# Patient Record
Sex: MALE | Race: WHITE | NOT HISPANIC OR LATINO | Employment: UNEMPLOYED | URBAN - METROPOLITAN AREA
[De-identification: names, ages, dates, MRNs, and addresses within clinical notes are randomized per-mention and may not be internally consistent; named-entity substitution may affect disease eponyms.]

---

## 2021-04-06 ENCOUNTER — HISTORICAL (OUTPATIENT)
Dept: LAB | Facility: HOSPITAL | Age: 61
End: 2021-04-06

## 2021-04-06 LAB
CK MB SERPL-MCNC: 42.3 NG/ML
CK SERPL-CCNC: 587 U/L (ref 30–200)
TROPONIN I SERPL-MCNC: 8.42 NG/ML (ref 0–0.04)

## 2022-04-26 DIAGNOSIS — Z12.11 ENCOUNTER FOR SCREENING COLONOSCOPY: ICD-10-CM

## 2022-04-26 DIAGNOSIS — K62.5 RECTAL BLEEDING: Primary | ICD-10-CM

## 2023-04-24 ENCOUNTER — HOSPITAL ENCOUNTER (EMERGENCY)
Facility: HOSPITAL | Age: 63
Discharge: HOME | End: 2023-04-24
Attending: FAMILY MEDICINE

## 2023-04-24 VITALS
HEART RATE: 67 BPM | DIASTOLIC BLOOD PRESSURE: 80 MMHG | TEMPERATURE: 98 F | BODY MASS INDEX: 30.12 KG/M2 | WEIGHT: 234.69 LBS | OXYGEN SATURATION: 96 % | SYSTOLIC BLOOD PRESSURE: 158 MMHG | RESPIRATION RATE: 17 BRPM | HEIGHT: 74 IN

## 2023-04-24 DIAGNOSIS — R16.0 LIVER MASSES: ICD-10-CM

## 2023-04-24 DIAGNOSIS — K62.89 RECTAL MASS: Primary | ICD-10-CM

## 2023-04-24 LAB
ALBUMIN SERPL-MCNC: 3 G/DL (ref 3.4–4.8)
ALBUMIN/GLOB SERPL: 0.7 RATIO (ref 1.1–2)
ALP SERPL-CCNC: 134 UNIT/L (ref 40–150)
ALT SERPL-CCNC: 11 UNIT/L (ref 0–55)
APPEARANCE UR: CLEAR
APTT PPP: 33.7 SECONDS
AST SERPL-CCNC: 17 UNIT/L (ref 5–34)
BACTERIA #/AREA URNS AUTO: NORMAL /HPF
BASOPHILS # BLD AUTO: 0.13 X10(3)/MCL (ref 0–0.2)
BASOPHILS NFR BLD AUTO: 1.3 %
BILIRUB UR QL STRIP.AUTO: NEGATIVE MG/DL
BILIRUBIN DIRECT+TOT PNL SERPL-MCNC: 0.6 MG/DL
BUN SERPL-MCNC: 16.8 MG/DL (ref 8.4–25.7)
CALCIUM SERPL-MCNC: 9.1 MG/DL (ref 8.8–10)
CHLORIDE SERPL-SCNC: 97 MMOL/L (ref 98–107)
CO2 SERPL-SCNC: 26 MMOL/L (ref 23–31)
COLOR UR AUTO: YELLOW
CREAT SERPL-MCNC: 1.16 MG/DL (ref 0.73–1.18)
EOSINOPHIL # BLD AUTO: 0.21 X10(3)/MCL (ref 0–0.9)
EOSINOPHIL NFR BLD AUTO: 2.1 %
ERYTHROCYTE [DISTWIDTH] IN BLOOD BY AUTOMATED COUNT: 12.2 % (ref 11.5–17)
GFR SERPLBLD CREATININE-BSD FMLA CKD-EPI: >60 MLS/MIN/1.73/M2
GLOBULIN SER-MCNC: 4.3 GM/DL (ref 2.4–3.5)
GLUCOSE SERPL-MCNC: 111 MG/DL (ref 82–115)
GLUCOSE UR QL STRIP.AUTO: NEGATIVE MG/DL
HCT VFR BLD AUTO: 40.6 % (ref 42–52)
HGB BLD-MCNC: 13.6 G/DL (ref 14–18)
IMM GRANULOCYTES # BLD AUTO: 0.03 X10(3)/MCL (ref 0–0.04)
IMM GRANULOCYTES NFR BLD AUTO: 0.3 %
KETONES UR QL STRIP.AUTO: NEGATIVE MG/DL
LEUKOCYTE ESTERASE UR QL STRIP.AUTO: NEGATIVE UNIT/L
LYMPHOCYTES # BLD AUTO: 1.51 X10(3)/MCL (ref 0.6–4.6)
LYMPHOCYTES NFR BLD AUTO: 15.4 %
MCH RBC QN AUTO: 28.6 PG (ref 27–31)
MCHC RBC AUTO-ENTMCNC: 33.5 G/DL (ref 33–36)
MCV RBC AUTO: 85.5 FL (ref 80–94)
MONOCYTES # BLD AUTO: 0.86 X10(3)/MCL (ref 0.1–1.3)
MONOCYTES NFR BLD AUTO: 8.7 %
NEUTROPHILS # BLD AUTO: 7.09 X10(3)/MCL (ref 2.1–9.2)
NEUTROPHILS NFR BLD AUTO: 72.2 %
NITRITE UR QL STRIP.AUTO: NEGATIVE
NRBC BLD AUTO-RTO: 0 %
PH UR STRIP.AUTO: 5.5 [PH]
PLATELET # BLD AUTO: 444 X10(3)/MCL (ref 130–400)
PMV BLD AUTO: 9 FL (ref 7.4–10.4)
POTASSIUM SERPL-SCNC: 4.3 MMOL/L (ref 3.5–5.1)
PROT SERPL-MCNC: 7.3 GM/DL (ref 5.8–7.6)
PROT UR QL STRIP.AUTO: NEGATIVE MG/DL
RBC # BLD AUTO: 4.75 X10(6)/MCL (ref 4.7–6.1)
RBC #/AREA URNS AUTO: NORMAL /HPF
RBC UR QL AUTO: NEGATIVE UNIT/L
SODIUM SERPL-SCNC: 134 MMOL/L (ref 136–145)
SP GR UR STRIP.AUTO: 1.01
SQUAMOUS #/AREA URNS LPF: NORMAL /HPF
UROBILINOGEN UR STRIP-ACNC: NORMAL MG/DL
WBC # SPEC AUTO: 9.8 X10(3)/MCL (ref 4.5–11.5)
WBC #/AREA URNS AUTO: NORMAL /HPF

## 2023-04-24 PROCEDURE — 81001 URINALYSIS AUTO W/SCOPE: CPT | Performed by: PHYSICIAN ASSISTANT

## 2023-04-24 PROCEDURE — 85025 COMPLETE CBC W/AUTO DIFF WBC: CPT | Performed by: PHYSICIAN ASSISTANT

## 2023-04-24 PROCEDURE — 85730 THROMBOPLASTIN TIME PARTIAL: CPT | Performed by: PHYSICIAN ASSISTANT

## 2023-04-24 PROCEDURE — 25500020 PHARM REV CODE 255: Performed by: PHYSICIAN ASSISTANT

## 2023-04-24 PROCEDURE — 99285 EMERGENCY DEPT VISIT HI MDM: CPT | Mod: 25

## 2023-04-24 PROCEDURE — 80053 COMPREHEN METABOLIC PANEL: CPT | Performed by: PHYSICIAN ASSISTANT

## 2023-04-24 PROCEDURE — 85610 PROTHROMBIN TIME: CPT | Performed by: PHYSICIAN ASSISTANT

## 2023-04-24 RX ORDER — HYDROCHLOROTHIAZIDE 25 MG/1
25 TABLET ORAL
COMMUNITY
Start: 2023-04-17

## 2023-04-24 RX ORDER — OMEPRAZOLE 40 MG/1
40 CAPSULE, DELAYED RELEASE ORAL
COMMUNITY
Start: 2022-09-18

## 2023-04-24 RX ORDER — LOSARTAN POTASSIUM 100 MG/1
100 TABLET ORAL EVERY MORNING
COMMUNITY
Start: 2023-03-27

## 2023-04-24 RX ADMIN — IOHEXOL 100 ML: 350 INJECTION, SOLUTION INTRAVENOUS at 01:04

## 2023-04-24 NOTE — DISCHARGE INSTRUCTIONS
Referral sent to gastrointestinal clinic.  They will call you to schedule an appointment.  If you have not received a phone call within 1 week call, our , Dian [phone].  Follow up with your primary care provider within 2-3 days.

## 2023-04-24 NOTE — ED PROVIDER NOTES
Encounter Date: 2023       History     Chief Complaint   Patient presents with    Back Pain    Rectal Bleeding     Pt arrives with c/o lower back pain and worsening rectal bleeding with dark and bright blood x1 week     62-year-old male with a history of CAD, HLD, hypertension, MI, obesity, presents emergency department with complaints of rectal bleeding x1 year that worsened within the last week, along with bilateral lower back pain and bilateral upper abdominal pain..  He also endorses weight loss ( about 30 lbs).  He states he is daily rectal bleeding with and without bowel movements which alternates between bright red blood and dark/black color.  He rates his pain 5/10.  He denies dizziness, lightheadedness, headache, shortness of breath, chest pain, dysuria, fever, chills, constipation.      The history is provided by the patient. No  was used.   Review of patient's allergies indicates:  No Known Allergies  Past Medical History:   Diagnosis Date    CAD (coronary artery disease)     HLD (hyperlipidemia)     HTN (hypertension)     MI (myocardial infarction)     Obesity, unspecified      Past Surgical History:   Procedure Laterality Date    CORONARY STENT PLACEMENT       History reviewed. No pertinent family history.  Social History     Tobacco Use    Smoking status: Former     Types: Cigarettes     Quit date:      Years since quittin.3     Review of Systems   Constitutional:  Positive for unexpected weight change (lost 30 lbs). Negative for chills and fever.   Respiratory:  Negative for cough and shortness of breath.    Cardiovascular:  Negative for chest pain and palpitations.   Gastrointestinal:  Positive for abdominal pain (upper), anal bleeding and blood in stool. Negative for constipation, nausea and vomiting.   Genitourinary:  Negative for dysuria, flank pain and hematuria.   Musculoskeletal:  Positive for back pain (bi lower). Negative for neck pain.   Skin:  Negative for  rash.   Neurological:  Negative for dizziness, light-headedness and headaches.     Physical Exam     Initial Vitals [04/24/23 1034]   BP Pulse Resp Temp SpO2   (!) 164/81 88 18 97.9 °F (36.6 °C) 99 %      MAP       --         Physical Exam    Nursing note and vitals reviewed.  Constitutional: He appears well-developed and well-nourished.   HENT:   Nose: Nose normal.   Mouth/Throat: Oropharynx is clear and moist.   Eyes: Conjunctivae are normal.   Neck: Neck supple.   Normal range of motion.  Cardiovascular:  Normal rate, normal heart sounds and intact distal pulses.           Pulmonary/Chest: Breath sounds normal.   Abdominal: Abdomen is soft. Bowel sounds are normal. There is no abdominal tenderness (Bilateral upper). There is no rebound and no guarding.   Genitourinary:    Genitourinary Comments: Patient declined     Musculoskeletal:         General: Normal range of motion.      Cervical back: Normal range of motion and neck supple. No bony tenderness.      Thoracic back: No bony tenderness.      Lumbar back: No bony tenderness.      Comments: No CVA tenderness     Lymphadenopathy:     He has no cervical adenopathy.   Neurological: He is alert and oriented to person, place, and time. GCS score is 15. GCS eye subscore is 4. GCS verbal subscore is 5. GCS motor subscore is 6.   Skin: Skin is warm. Capillary refill takes less than 2 seconds.       ED Course   Procedures  Labs Reviewed   COMPREHENSIVE METABOLIC PANEL - Abnormal; Notable for the following components:       Result Value    Sodium Level 134 (*)     Chloride 97 (*)     Albumin Level 3.0 (*)     Globulin 4.3 (*)     Albumin/Globulin Ratio 0.7 (*)     All other components within normal limits   CBC WITH DIFFERENTIAL - Abnormal; Notable for the following components:    Hgb 13.6 (*)     Hct 40.6 (*)     Platelet 444 (*)     All other components within normal limits   APTT - Normal   CBC W/ AUTO DIFFERENTIAL    Narrative:     The following orders were created  for panel order CBC Auto Differential.  Procedure                               Abnormality         Status                     ---------                               -----------         ------                     CBC with Differential[[id number]]        Abnormal            Final result                 Please view results for these tests on the individual orders.   URINALYSIS, REFLEX TO URINE CULTURE   PROTIME-INR   EXTRA TUBES    Narrative:     The following orders were created for panel order EXTRA TUBES.  Procedure                               Abnormality         Status                     ---------                               -----------         ------                     Light Green Top Hold[[id number]]                             In process                 Lavender Top Hold[[id number]]                                In process                 Gold Top Hold[[id number]]                                    In process                 Pink Top Hold[[id number]]                                    In process                   Please view results for these tests on the individual orders.   LIGHT GREEN TOP HOLD   LAVENDER TOP HOLD   GOLD TOP HOLD   PINK TOP HOLD          Imaging Results               CT Abdomen Pelvis With Contrast (Final result)  Result time 04/24/23 13:32:27      Final result by Tylor Ash MD (04/24/23 13:32:27)                   Impression:      Findings seen consistent with malignancy in the rectum with metastatic pelvic lymphadenopathy seen and metastatic foci seen throughout the liver    This report was flagged in Epic as abnormal.      Electronically signed by: Tylor Ash  Date:    04/24/2023  Time:    13:32               Narrative:    EXAMINATION:  CT ABDOMEN PELVIS WITH CONTRAST    CLINICAL HISTORY:  GI bleed;rectal bleeding x 1 year, lower abdominal pain;    TECHNIQUE:  Low dose axial images, sagittal and coronal reformations were obtained from the lung bases to the pubic  symphysis following the IV administration of contrast. Automatic exposure control (AEC) is utilized to reduce patient radiation exposure.    COMPARISON:  None.    FINDINGS:  The lung bases are clear.    There are multiple liver masses seen which are too numerous to count.  There are predominantly hypoattenuated.  Findings are consistent with metastatic foci.  Reference measurement is made of the largest liver lesion which is seen in segment 3.  It measures 5.1 by 5.1 cm.  The portal and hepatic veins appear grossly unremarkable.    The gallbladder appears normal.  No obvious gallstones are seen.  No biliary dilatation is seen.  No pericholecystic fluid is seen.    The pancreas appears normal.  No pancreatic mass or lesion is seen.    The spleen shows no acute abnormality.    The adrenal glands appear normal.  No adrenal nodule is seen.    The kidneys appear normal.  No hydronephrosis is seen.  No hydroureter is seen.  No nephrolithiasis is seen.  No obvious ureteral stones are seen.    Urinary bladder appears grossly unremarkable.    There is circumferential wall thickening seen in the region of the rectum with a mass seen in the region of the rectum.  This likely represents the primary malignancy.  There are multiple lymph nodes seen in the pelvis.  Largest lymph nodes seen in the right deep hemipelvis on image number 82 series 2.  It measures 1.4 x 2 cm.    No free air is seen.  No free fluid is seen.                                       Medications   iohexoL (OMNIPAQUE 350) 350 mg iodine/mL injection (has no administration in time range)   iohexoL (OMNIPAQUE 350) injection 100 mL (100 mLs Intravenous Given 4/24/23 1325)     Medical Decision Making:   Initial Assessment:   62-year-old male with a history of CAD, HLD, hypertension, MI, obesity, presents emergency department with complaints of rectal bleeding x1 year that worsened within the last week, along with bilateral lower back pain and bilateral upper  abdominal pain  Differential Diagnosis:    hemorrhoids, constipation, malignancy, fissure  Clinical Tests:   Lab Tests: Ordered and Reviewed       <> Summary of Lab: Hemoglobin 13.6 hematocrit 46  Radiological Study: Ordered and Reviewed  ED Management:  CT abdomen and pelvis with contrast: Findings seen consistent with malignancy in the rectum with metastatic pelvic lymphadenopathy seen and metastatic foci seen throughout the liver      Referral sent to GI clinic for emergent follow-up.  I spoke with our  Dian, who will help getting close follow up appointment.  Patient called his PCP to inform them of diagnosis.     He declined pain and anxiety medication prescription.      The patient is resting comfortably and in no acute distress.  personally discussed his test results and treatment plan.  Gave strict ED precautions and specific conditions for return to the emergency department and importance of follow up with pcp and GI.  Patient voices understanding and agrees to the plan discussed. All patients' questions have been answered at this time. He has remained hemodynamically stable throughout entire stay in ED and is stable for discharge home.            ED Course as of 04/24/23 1622   Mon Apr 24, 2023   1202 Hemoglobin(!): 13.6 [ER]   1202 Hematocrit(!): 40.6 [ER]   1416 CT Abdomen Pelvis With Contrast(!)  Findings seen consistent with malignancy in the rectum with metastatic pelvic lymphadenopathy seen and metastatic foci seen throughout the liver [ER]      ED Course User Index  [ER] ZACH Altman                 Clinical Impression:   Final diagnoses:  [K62.89] Rectal mass (Primary)  [R16.0] Liver masses        ED Disposition Condition    Discharge Stable          ED Prescriptions    None       Follow-up Information       Follow up With Specialties Details Why Contact Info Additional Information    [facility] - Emergency Dept Emergency Medicine  As needed, If symptoms worsen [address]  [facility] [id number] [phone]     [facility] Gastroenterology Gastroenterology  they will call to schedule apt [address] [facility] [id number] [phone] Entrance 1             ZACH Altman  04/24/23 1621

## 2023-04-26 ENCOUNTER — TELEPHONE (OUTPATIENT)
Dept: ENDOSCOPY | Facility: HOSPITAL | Age: 63
End: 2023-04-26

## 2023-04-26 DIAGNOSIS — I25.10 CORONARY ARTERY DISEASE, UNSPECIFIED VESSEL OR LESION TYPE, UNSPECIFIED WHETHER ANGINA PRESENT, UNSPECIFIED WHETHER NATIVE OR TRANSPLANTED HEART: Primary | ICD-10-CM

## 2023-04-26 NOTE — TELEPHONE ENCOUNTER
04/26/2023 Called Dr. Lilo Pickett cardiologist to schedule patient for a cardiac clearance appointment, office is closed at present time, not able to leave call back message, will continue to try and schedule appointment. MS

## 2023-04-28 ENCOUNTER — OFFICE VISIT (OUTPATIENT)
Dept: CARDIOLOGY | Facility: CLINIC | Age: 63
End: 2023-04-28

## 2023-04-28 VITALS
WEIGHT: 230.63 LBS | HEART RATE: 71 BPM | DIASTOLIC BLOOD PRESSURE: 69 MMHG | BODY MASS INDEX: 29.6 KG/M2 | OXYGEN SATURATION: 100 % | RESPIRATION RATE: 18 BRPM | HEIGHT: 74 IN | TEMPERATURE: 98 F | SYSTOLIC BLOOD PRESSURE: 131 MMHG

## 2023-04-28 DIAGNOSIS — E78.2 MIXED HYPERLIPIDEMIA: ICD-10-CM

## 2023-04-28 DIAGNOSIS — I25.10 CORONARY ARTERY DISEASE, UNSPECIFIED VESSEL OR LESION TYPE, UNSPECIFIED WHETHER ANGINA PRESENT, UNSPECIFIED WHETHER NATIVE OR TRANSPLANTED HEART: ICD-10-CM

## 2023-04-28 DIAGNOSIS — I25.10 ATHEROSCLEROSIS OF NATIVE CORONARY ARTERY OF NATIVE HEART WITHOUT ANGINA PECTORIS: ICD-10-CM

## 2023-04-28 DIAGNOSIS — I25.2 OLD MYOCARDIAL INFARCTION: ICD-10-CM

## 2023-04-28 DIAGNOSIS — E66.3 OVER WEIGHT: ICD-10-CM

## 2023-04-28 DIAGNOSIS — I10 PRIMARY HYPERTENSION: ICD-10-CM

## 2023-04-28 DIAGNOSIS — K21.9 GASTROESOPHAGEAL REFLUX DISEASE WITHOUT ESOPHAGITIS: ICD-10-CM

## 2023-04-28 DIAGNOSIS — Z01.810 PREOP CARDIOVASCULAR EXAM: ICD-10-CM

## 2023-04-28 PROCEDURE — 99213 OFFICE O/P EST LOW 20 MIN: CPT | Mod: PBBFAC | Performed by: INTERNAL MEDICINE

## 2023-04-28 NOTE — PROGRESS NOTES
04/28/2023 9:14 AM    Subjective:     CHIEF COMPLAINT:   Chief Complaint   Patient presents with    New referral for cardiac risk assessment.     Needs clearance for colon mass via CT scan. Needs colonoscopy. Complains of dizziness a couple of weeks ago.                            HPI:    Mr. Ashkan Mathews is a 62 y.o. male.      Today the patient comes for a new patient evaluation.  He had 3 vessel CABG at [facility] 2 years ago after an MI.   Prior to that he had PCI.      CP:  The patient has no chest discomfort.      SOB:  The patient denies shortness of breath Has BRAGA on occasion.     EDEMA:  The patient denies edema.      ORTHOPNEA:  The patient denies orthopnea.  No PND.      SYNCOPE:  The patient denies near syncope.  No syncope.   Had single episode of getting lightheaded and feeling like he would pass out (occurred 2 weeks ago).  He was over heated at the time and symptoms improved after he cooled down.     PALPITATIONS:  The patient has no palpitations.    LEVEL OF EXERTION:  The patient works.  The patient does not have symptoms with this level of exertion.  The patient's level of exertion is good..  METS:  The patient does the following activities:    He works as a  and lifts heavy items and strains without difficulty.  He does >4 METS.     The patient denies recent cardiac testing.     DATA FOR RCRI:  The patient:   Has upcoming procedure/surgery  Has CAD.  (MI, Abnormal stress test, Angina, Use of NTG, MI on EKG)   Denies CHF  Denies TIA or CVA.  Is not being treated with insulin.   Creatinine is not > 2 mg/dL   Lab Results   Component Value Date    CREATININE 1.16 04/24/2023     The patient's RCRI is 2.  Based on this score the patient's 30-day risk of death, MI, or cardiac arrest with surgery is 10.1%.    The patient has intermediate cardiac risk for moderate risk procedure.      Past Medical History    Patient Active Problem List   Diagnosis    Mixed hyperlipidemia     "Primary hypertension    Old myocardial infarction    Atherosclerosis of native coronary artery of native heart without angina pectoris    Over weight    Preop cardiovascular exam    Gastroesophageal reflux disease without esophagitis       Surgical History    Past Surgical History:   Procedure Laterality Date    CORONARY STENT PLACEMENT         Social History     Socioeconomic History    Marital status:     Number of children: 2   Tobacco Use    Smoking status: Former     Types: Cigarettes     Quit date:      Years since quittin.3   Substance and Sexual Activity    Alcohol use: Yes    Drug use: Never       No family history on file.  Review of patient's allergies indicates:  No Known Allergies    Current Medications    Current Outpatient Medications   Medication Instructions    amLODIPine (NORVASC) 5 mg, Oral, Daily    hydroCHLOROthiazide (HYDRODIURIL) 25 mg, Oral    losartan (COZAAR) 100 mg, Oral, Every morning    metoprolol succinate (TOPROL-XL) 50 mg, Oral, Nightly    omeprazole (PRILOSEC) 40 mg, Oral       ROS:     GEN   No fever  No weakness  RESP  No SOB  No wheezing  SKIN  No pruritus  No rash  CARD  No CP  No palpitations        VASC  No cyanosis  No claudication  HEM   No adenopathy  No easy bruising   GI  Rare heart burn  + melena    NEURO  No dizziness  No syncope    Objective:     PE:  Blood pressure 131/69, pulse 71, temperature 98.1 °F (36.7 °C), resp. rate 18, height 6' 2.41" (1.89 m), weight 104.6 kg (230 lb 9.6 oz), SpO2 100 %.     BP Readings from Last 3 Encounters:   23 131/69   23 (!) 158/80        Pulse Readings from Last 3 Encounters:   23 71   23 67        Temp Readings from Last 3 Encounters:   23 98.1 °F (36.7 °C)   23 97.9 °F (36.6 °C) (Oral)       Wt Readings from Last 3 Encounters:   23 104.6 kg (230 lb 9.6 oz)   23 106.4 kg (234 lb 10.9 oz)         GENERAL:  Ambulates  CONSTITUTIONAL:  No acute distress.  Not ill " appearing.  NECK:  No cervical adenopathy.  No carotid bruit.  CARDIOVASCULAR:  Normal rate.  Regular rhythm.  No murmur.  PULMONARY:  No respiratory distress.  No wheezing.  No crackles.  ABDOMINAL:  No distention.  No tenderness.  MUSCULOSKELETAL:  No deformity.  No edema  SKIN:  No bruising.  No rash.  NEUROLOGICAL:  Oriented x 3.  No weakness.                                                                                                                                                                                                                                                                                                                                                                                                                                                                               CARDIAC TESTING:  Echocardiogram  No results found for this or any previous visit.      Stress Test  No results found for this or any previous visit.      Coronary Angiogram  No results found for this or any previous visit.     Last BMP  Lab Results   Component Value Date     (L) 04/24/2023    K 4.3 04/24/2023    CO2 26 04/24/2023    BUN 16.8 04/24/2023    CREATININE 1.16 04/24/2023    CALCIUM 9.1 04/24/2023    EGFRNORACEVR >60 04/24/2023       Lab Results   Component Value Date    CREATININE 1.16 04/24/2023    CREATININE 0.94 04/06/2021     Last CBC     Lab Results   Component Value Date    WBC 9.8 04/24/2023    HGB 13.6 (L) 04/24/2023    HCT 40.6 (L) 04/24/2023    MCV 85.5 04/24/2023     (H) 04/24/2023           Last lipids  No results found for: CHOL, HDL, LDL, TRIG, TOTALCHOLEST    LFT   No components found for: LFT    Assessment:     1. Coronary artery disease, unspecified vessel or lesion type, unspecified whether angina present, unspecified whether native or transplanted heart  - Ambulatory referral/consult to Cardiology    2. Mixed hyperlipidemia    3. Primary hypertension    4. Old myocardial  infarction    5. Atherosclerosis of native coronary artery of native heart without angina pectoris    6. Over weight    7. Preop cardiovascular exam    8. Gastroesophageal reflux disease without esophagitis      Pre-Op Cardiac Risk Assessment:  Please see RCRI above.  The patient is at moderate cardiac risk for a intermediate risk procedure.     B-blocker:  continue metoprolol in the korey-operative period.    10 Year Cardiovascular Risk:  The ASCVD Risk score (Wild BLANCO, et al., 2019) failed to calculate for the following reasons:    The patient has a prior MI or stroke diagnosis    BMI:  Body mass index is 29.28 kg/m².  Patient is overweight (BMI 25-29.0)  Tobacco:  denied  quit about 15 years ago  Alcohol:  approx 4 x a week   Substance abuse:  none   Last PCP visit:  Patient does not have a PCP or has not yet seen their PCP    Plan:     Medications:  refills are done by PCP    Diet:  Avoid processed foods and drinks, sugar, salt, fried/greasy foods, and fast foods    Recommend 10 servings of fruits and vegetables per day    Exercise:  continue active lifestyle    Follow up:  4 months    Ludwin Meyer MD  Cardiology Attending     No future appointments.        Cardiology Attending Addendum  06/02/2023 9:40 AM    Pre-Op Cardiac Risk Assessment    Mr. Ashkan Mathews is a 62 y.o. 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    Anorexia    Unintended weight gain    Rectal bleeding    Rectal pain    Tenesmus     Past Surgical History:   Procedure Laterality Date    COLONOSCOPY, WITH 1 OR MORE  BIOPSIES  5/4/2023    Procedure: COLONOSCOPY, WITH 1 OR MORE BIOPSIES;  Surgeon: Placido Mckeon MD;  Location: [facility] ENDOSCOPY;  Service: Endoscopy;;    COLONOSCOPY, WITH POLYPECTOMY USING SNARE N/A 5/4/2023    Procedure: COLONOSCOPY, WITH POLYPECTOMY USING SNARE;  Surgeon: Placido Mckeon MD;  Location: [facility] ENDOSCOPY;  Service: Endoscopy;  Laterality: N/A;    CORONARY ARTERY BYPASS GRAFT  04/2021    CORONARY STENT PLACEMENT         The patient had the following CARDIAC TESTING:  Echo:    No results found for this or any previous visit.    Stress test:  No results found for this or any previous visit.     Coronary angiogram:  No results found for this or any previous visit.       Current Outpatient Medications   Medication Instructions    amLODIPine (NORVASC) 5 mg, Oral, Daily    hydroCHLOROthiazide (HYDRODIURIL) 25 mg, Oral    HYDROcodone-acetaminophen (NORCO)  mg per tablet 1 tablet, Oral, Every 4 hours PRN    losartan (COZAAR) 100 mg, Oral, Every morning    megestroL (MEGACE) 800 mg, Oral, Daily    metoprolol succinate (TOPROL-XL) 50 mg, Oral, Nightly    omeprazole (PRILOSEC) 40 mg, Oral    ondansetron (ZOFRAN) 4 mg, Oral, Every 6 hours PRN    polyethylene glycol (MOVIPREP) 100-7.5-2.691 gram solution Take a directed per instructions provided by GI Clinic.       The patient is not on any medications that affect clotting.     CARDIAC RISK ASSESSMENT  The patient is at moderate cardiac risk for a low to intermediate risk procedure.       RECOMMENDATIONS  Beta-blocker:  The patient is on a b-blocker.    Continue the metoprolol in the perioperative period.    Ludwin Meyer MD

## 2023-04-30 ENCOUNTER — ANESTHESIA EVENT (OUTPATIENT)
Dept: ENDOSCOPY | Facility: HOSPITAL | Age: 63
End: 2023-04-30

## 2023-04-30 NOTE — ANESTHESIA PREPROCEDURE EVALUATION
"                                                                                                             04/30/2023  Ashkan Mathews is a 62 y.o., male for N Screening       History of Rectal Mass , CAD s/p 3v CABG 2021 ,PCI,  HLD, HTN, Heavy smoker,  MO, GERD; greater than 4 mets daily without difficulty , poor dentition is noted     Vitals:    05/04/23 1253 05/04/23 1302   Pulse:  95   Resp:  16   SpO2:  96%   Weight: 103.9 kg (229 lb)    Height: 6' 2" (1.88 m)        There were no vitals filed for this visit.      PER CARDS 4.27.23 -- no noted EKG, ECHO in chart    Pre-Op Cardiac Risk Assessment: The patient is patient has intermediate cardiac risk for moderate risk procedure.  at moderate cardiac risk for a intermediate risk procedure.     B-blocker:  continue metoprolol in the korey-operative period                Lab Results   Component Value Date    WBC 9.8 04/24/2023    HGB 13.6 (L) 04/24/2023    HCT 40.6 (L) 04/24/2023     (H) 04/24/2023    ALT 11 04/24/2023    AST 17 04/24/2023     (L) 04/24/2023    K 4.3 04/24/2023    CREATININE 1.16 04/24/2023    BUN 16.8 04/24/2023    CO2 26 04/24/2023    INR 1.10 04/24/2023     Pre-op Assessment    I have reviewed the Patient Summary Reports.     I have reviewed the Nursing Notes. I have reviewed the NPO Status.      Review of Systems  Anesthesia Hx:  No problems with previous Anesthesia  History of prior surgery of interest to airway management or planning: Denies Family Hx of Anesthesia complications.   Denies Personal Hx of Anesthesia complications.   Hematology/Oncology:  Hematology Normal   Oncology Normal     EENT/Dental:EENT/Dental Normal   Cardiovascular:  Cardiovascular Normal     Pulmonary:  Pulmonary Normal    Renal/:  Renal/ Normal     Hepatic/GI:  Hepatic/GI Normal    Musculoskeletal:  Musculoskeletal Normal    Neurological:  Neurology Normal    Endocrine:  Endocrine Normal    Dermatological:  Skin Normal    Psych:  Psychiatric " Normal           Physical Exam  General: Well nourished, Cooperative, Alert and Oriented    Airway:  Mallampati: I / I  Mouth Opening: Normal  TM Distance: Normal  Tongue: Large    Dental:  Intact    Chest/Lungs:  expiratory wheezes        Anesthesia Plan  Type of Anesthesia, risks & benefits discussed:    Anesthesia Type: Gen Natural Airway  Intra-op Monitoring Plan: Standard ASA Monitors  Post Op Pain Control Plan: IV/PO Opioids PRN  (medical reason for not using multimodal pain management)  Induction:  IV  Informed Consent: Informed consent signed with the Patient and all parties understand the risks and agree with anesthesia plan.  All questions answered. Patient consented to blood products? No  ASA Score: 4  Day of Surgery Review of History & Physical: H&P Update referred to the surgeon/provider.    Ready For Surgery From Anesthesia Perspective.     .

## 2023-05-01 ENCOUNTER — TELEPHONE (OUTPATIENT)
Dept: ENDOSCOPY | Facility: HOSPITAL | Age: 63
End: 2023-05-01

## 2023-05-01 NOTE — TELEPHONE ENCOUNTER
Called patient to inform patient anesthesia is requesting  a current EKG/ECHO before colonoscopy procedure is done. I informed patient [facility] cardio was notified of anesthesia request and that someone will notify him as soon as we have information. Patient verbalized understanding.  MS

## 2023-05-02 DIAGNOSIS — K62.89 RECTAL MASS: Primary | ICD-10-CM

## 2023-05-02 RX ORDER — POLYETHYLENE GLYCOL 3350, SODIUM SULFATE, SODIUM CHLORIDE, POTASSIUM CHLORIDE, SODIUM ASCORBATE, AND ASCORBIC ACID 7.5-2.691G
KIT ORAL
Qty: 1 KIT | Refills: 0 | Status: SHIPPED | OUTPATIENT
Start: 2023-05-02

## 2023-05-04 ENCOUNTER — HOSPITAL ENCOUNTER (OUTPATIENT)
Facility: HOSPITAL | Age: 63
Discharge: HOME | End: 2023-05-04
Attending: INTERNAL MEDICINE | Admitting: INTERNAL MEDICINE

## 2023-05-04 ENCOUNTER — ANESTHESIA (OUTPATIENT)
Dept: ENDOSCOPY | Facility: HOSPITAL | Age: 63
End: 2023-05-04

## 2023-05-04 DIAGNOSIS — K62.89 RECTAL MASS: ICD-10-CM

## 2023-05-04 DIAGNOSIS — R93.89 ABNORMAL CT SCAN: ICD-10-CM

## 2023-05-04 DIAGNOSIS — C20 RECTAL CANCER: Primary | ICD-10-CM

## 2023-05-04 DIAGNOSIS — C20 RECTAL CANCER: ICD-10-CM

## 2023-05-04 DIAGNOSIS — K62.89 RECTAL MASS: Primary | ICD-10-CM

## 2023-05-04 LAB — POCT GLUCOSE: 98 MG/DL (ref 70–110)

## 2023-05-04 PROCEDURE — 25000242 PHARM REV CODE 250 ALT 637 W/ HCPCS: Performed by: SPECIALIST

## 2023-05-04 PROCEDURE — 45385 PR COLONOSCOPY,REMV LESN,SNARE: ICD-10-PCS | Mod: ,,, | Performed by: INTERNAL MEDICINE

## 2023-05-04 PROCEDURE — 88305 TISSUE EXAM BY PATHOLOGIST: CPT | Mod: TC | Performed by: INTERNAL MEDICINE

## 2023-05-04 PROCEDURE — 37000008 HC ANESTHESIA 1ST 15 MINUTES: Performed by: INTERNAL MEDICINE

## 2023-05-04 PROCEDURE — 45385 COLONOSCOPY W/LESION REMOVAL: CPT | Performed by: INTERNAL MEDICINE

## 2023-05-04 PROCEDURE — 63600175 PHARM REV CODE 636 W HCPCS: Performed by: NURSE ANESTHETIST, CERTIFIED REGISTERED

## 2023-05-04 PROCEDURE — 63600175 PHARM REV CODE 636 W HCPCS: Performed by: SPECIALIST

## 2023-05-04 PROCEDURE — 27201423 OPTIME MED/SURG SUP & DEVICES STERILE SUPPLY: Performed by: INTERNAL MEDICINE

## 2023-05-04 PROCEDURE — 94640 AIRWAY INHALATION TREATMENT: CPT

## 2023-05-04 PROCEDURE — 37000009 HC ANESTHESIA EA ADD 15 MINS: Performed by: INTERNAL MEDICINE

## 2023-05-04 PROCEDURE — 45385 COLONOSCOPY W/LESION REMOVAL: CPT | Mod: ,,, | Performed by: INTERNAL MEDICINE

## 2023-05-04 PROCEDURE — 82962 GLUCOSE BLOOD TEST: CPT | Performed by: INTERNAL MEDICINE

## 2023-05-04 PROCEDURE — 25000003 PHARM REV CODE 250: Performed by: INTERNAL MEDICINE

## 2023-05-04 PROCEDURE — D9220A PRA ANESTHESIA: ICD-10-PCS | Mod: ,,, | Performed by: NURSE ANESTHETIST, CERTIFIED REGISTERED

## 2023-05-04 PROCEDURE — D9220A PRA ANESTHESIA: Mod: ,,, | Performed by: NURSE ANESTHETIST, CERTIFIED REGISTERED

## 2023-05-04 PROCEDURE — 25000003 PHARM REV CODE 250: Performed by: NURSE ANESTHETIST, CERTIFIED REGISTERED

## 2023-05-04 RX ORDER — METOPROLOL SUCCINATE 25 MG/1
25 TABLET, EXTENDED RELEASE ORAL DAILY
Status: DISCONTINUED | OUTPATIENT
Start: 2023-05-04 | End: 2023-05-04 | Stop reason: HOSPADM

## 2023-05-04 RX ORDER — LEVALBUTEROL INHALATION SOLUTION 1.25 MG/3ML
1.25 SOLUTION RESPIRATORY (INHALATION) ONCE
Status: COMPLETED | OUTPATIENT
Start: 2023-05-04 | End: 2023-05-04

## 2023-05-04 RX ORDER — SODIUM CHLORIDE 9 MG/ML
INJECTION, SOLUTION INTRAVENOUS CONTINUOUS
Status: CANCELLED | OUTPATIENT
Start: 2023-05-04

## 2023-05-04 RX ORDER — SODIUM CHLORIDE, SODIUM LACTATE, POTASSIUM CHLORIDE, CALCIUM CHLORIDE 600; 310; 30; 20 MG/100ML; MG/100ML; MG/100ML; MG/100ML
INJECTION, SOLUTION INTRAVENOUS CONTINUOUS
Status: DISCONTINUED | OUTPATIENT
Start: 2023-05-04 | End: 2023-05-04 | Stop reason: HOSPADM

## 2023-05-04 RX ORDER — LEVALBUTEROL INHALATION SOLUTION 1.25 MG/3ML
1.25 SOLUTION RESPIRATORY (INHALATION)
Status: CANCELLED | OUTPATIENT
Start: 2023-05-04 | End: 2023-05-05

## 2023-05-04 RX ORDER — INSULIN ASPART 100 [IU]/ML
4-12 INJECTION, SOLUTION INTRAVENOUS; SUBCUTANEOUS
Status: CANCELLED | OUTPATIENT
Start: 2023-05-04

## 2023-05-04 RX ORDER — LIDOCAINE HYDROCHLORIDE 20 MG/ML
INJECTION INTRAVENOUS
Status: DISCONTINUED | OUTPATIENT
Start: 2023-05-04 | End: 2023-05-04

## 2023-05-04 RX ORDER — DIAZEPAM 5 MG/1
10 TABLET ORAL
Status: CANCELLED | OUTPATIENT
Start: 2023-05-04 | End: 2023-05-04

## 2023-05-04 RX ORDER — SODIUM CHLORIDE, SODIUM LACTATE, POTASSIUM CHLORIDE, CALCIUM CHLORIDE 600; 310; 30; 20 MG/100ML; MG/100ML; MG/100ML; MG/100ML
1000 INJECTION, SOLUTION INTRAVENOUS CONTINUOUS
Status: CANCELLED | OUTPATIENT
Start: 2023-05-04

## 2023-05-04 RX ORDER — METOPROLOL SUCCINATE 50 MG/1
50 TABLET, EXTENDED RELEASE ORAL
Status: CANCELLED | OUTPATIENT
Start: 2023-05-04 | End: 2023-05-05

## 2023-05-04 RX ORDER — PROPOFOL 10 MG/ML
INJECTION, EMULSION INTRAVENOUS
Status: DISCONTINUED | OUTPATIENT
Start: 2023-05-04 | End: 2023-05-04

## 2023-05-04 RX ADMIN — PROPOFOL 25 MG: 10 INJECTION, EMULSION INTRAVENOUS at 02:05

## 2023-05-04 RX ADMIN — LEVALBUTEROL HYDROCHLORIDE 1.25 MG: 1.25 SOLUTION RESPIRATORY (INHALATION) at 01:05

## 2023-05-04 RX ADMIN — SODIUM CHLORIDE, POTASSIUM CHLORIDE, SODIUM LACTATE AND CALCIUM CHLORIDE: 600; 310; 30; 20 INJECTION, SOLUTION INTRAVENOUS at 01:05

## 2023-05-04 RX ADMIN — PROPOFOL 75 MG: 10 INJECTION, EMULSION INTRAVENOUS at 02:05

## 2023-05-04 RX ADMIN — LIDOCAINE HYDROCHLORIDE 50 MG: 20 INJECTION, SOLUTION INTRAVENOUS at 02:05

## 2023-05-04 RX ADMIN — METOPROLOL SUCCINATE 25 MG: 25 TABLET, EXTENDED RELEASE ORAL at 01:05

## 2023-05-04 NOTE — PLAN OF CARE
Received pt from procedural area AAOx3 NAD at this time. Fly member at pt bedside. VSS. Dr Mckeon at pt bedside to discuss finding of procedure with patient and fly member.

## 2023-05-04 NOTE — PROVATION PATIENT INSTRUCTIONS
Discharge Summary/Instructions after an Endoscopic Procedure  Patient Name: Ashkan Mathews  Patient MRN: [id number]  Patient YOB: 1960  Thursday, May 4, 2023  Placido Mckeon MD  Dear patient,  As a result of recent federal legislation (The Federal Cures Act), you may   receive lab or pathology results from your procedure in your [facility]   account before your physician is able to contact you. Your physician or   their representative will relay the results to you with their   recommendations at their soonest availability.  Thank you,  RESTRICTIONS:  During your procedure today, you received medications for sedation.  These   medications may affect your judgment, balance and coordination.  Therefore,   for 24 hours, you have the following restrictions:   - DO NOT drive a car, operate machinery, make legal/financial decisions,   sign important papers or drink alcohol.    ACTIVITY:  Today: no heavy lifting, straining or running due to procedural   sedation/anesthesia.  The following day: return to full activity including work.  DIET:  Eat and drink normally unless instructed otherwise.     TREATMENT FOR COMMON SIDE EFFECTS:  - Mild abdominal pain, nausea, belching, bloating or excessive gas:  rest,   eat lightly and use a heating pad.  - Sore Throat: treat with throat lozenges and/or gargle with warm salt   water.  - Because air was used during the procedure, expelling large amounts of air   from your rectum or belching is normal.  - If a bowel prep was taken, you may not have a bowel movement for 1-3 days.    This is normal.  SYMPTOMS TO WATCH FOR AND REPORT TO YOUR PHYSICIAN:  1. Abdominal pain or bloating, other than gas cramps.  2. Chest pain.  3. Back pain.  4. Signs of infection such as: chills or fever occurring within 24 hours   after the procedure.  5. Rectal bleeding, which would show as bright red, maroon, or black stools.   (A tablespoon of blood from the rectum is not serious, especially  if   hemorrhoids are present.)  6. Vomiting.  7. Weakness or dizziness.  GO DIRECTLY TO THE NEAREST EMERGENCY ROOM IF YOU HAVE ANY OF THE FOLLOWING:      Difficulty breathing              Chills and/or fever over 101 F   Persistent vomiting and/or vomiting blood   Severe abdominal pain   Severe chest pain   Black, tarry stools   Bleeding- more than one tablespoon   Any other symptom or condition that you feel may need urgent attention  Your doctor recommends these additional instructions:  If any biopsies were taken, your doctors clinic will contact you in 1 to 2   weeks with any results.  - Repeat colonoscopy in 1 year for surveillance.   - Refer to a surgeon at the next available appointment.   - Discharge patient to home (ambulatory).  For questions, problems or results please call your physician - Placido Mckeon MD at Work:  [phone].  [facility] , EMERGENCY ROOM PHONE NUMBER: [phone]  IF A COMPLICATION OR EMERGENCY SITUATION ARISES AND YOU ARE UNABLE TO REACH   YOUR PHYSICIAN - GO DIRECTLY TO THE EMERGENCY ROOM.  MD Placido Hurtado MD  5/4/2023 2:48:18 PM  This report has been verified and signed electronically.  Dear patient,  As a result of recent federal legislation (The Federal Cures Act), you may   receive lab or pathology results from your procedure in your [facility]   account before your physician is able to contact you. Your physician or   their representative will relay the results to you with their   recommendations at their soonest availability.  Thank you,  PROVATION

## 2023-05-04 NOTE — PLAN OF CARE
Pt to be seen in General Surgery Clinic on May 9th at 12noon. Pt was instructed by Dr. Mckeon to keep this appointment.

## 2023-05-04 NOTE — ANESTHESIA POSTPROCEDURE EVALUATION
Anesthesia Post Evaluation    Patient: Ashkan Mathews    Procedure(s) Performed: Procedure(s) (LRB):  COLONOSCOPY, WITH POLYPECTOMY USING SNARE (N/A)  COLONOSCOPY, WITH 1 OR MORE BIOPSIES    Final Anesthesia Type: general      Patient location during evaluation: GI PACU  Patient participation: Yes- Able to Participate  Level of consciousness: awake and alert  Post-procedure vital signs: reviewed and stable  Pain management: adequate    PONV status at discharge: No PONV  Anesthetic complications: no      Cardiovascular status: hemodynamically stable  Respiratory status: unassisted, spontaneous ventilation and room air  Follow-up not needed.          Vitals Value Taken Time   /84 05/04/23 1329   Temp 36 05/04/23 1435   Pulse 94 05/04/23 1329   Resp 16 05/04/23 1302   SpO2 96 % 05/04/23 1302         No case tracking events are documented in the log.      Pain/Koko Score: No data recorded

## 2023-05-04 NOTE — DISCHARGE SUMMARY
[facility] - Endoscopy  Discharge Note  Short Stay    Procedure(s) (LRB):  COLONOSCOPY, WITH POLYPECTOMY USING SNARE (N/A)  COLONOSCOPY, WITH 1 OR MORE BIOPSIES      OUTCOME: Patient tolerated treatment/procedure well without complication and is now ready for discharge.    DISPOSITION: Home or Self Care    FINAL DIAGNOSIS:  <principal problem not specified>    FOLLOWUP: In clinic    DISCHARGE INSTRUCTIONS:    Discharge Procedure Orders   Diet general     Call MD for:  temperature >100.4     Call MD for:  persistent nausea and vomiting     Call MD for:  severe uncontrolled pain     Call MD for:  difficulty breathing, headache or visual disturbances     Activity as tolerated         Clinical Reference Documents Added to Patient Instructions         Document    COLONOSCOPY DISCHARGE INSTRUCTIONS (ENGLISH)            TIME SPENT ON DISCHARGE: 10 minutes

## 2023-05-04 NOTE — DISCHARGE SUMMARY
[facility] - Endoscopy  Discharge Note  Short Stay    Procedure(s) (LRB):  COLONOSCOPY, WITH POLYPECTOMY USING SNARE (N/A)  COLONOSCOPY, WITH 1 OR MORE BIOPSIES  Procedure of colonoscopy was explained to the patient and consent obtained, the patient was transferred to the endoscopy suite.  General IV anesthesia was administered per anesthesia services.  Rectal exam revealed a large rectal mass that started just above the anal verge and extended beyond the bite finger.  It seemed to occupy about 2/3 of the circumference of the rectum.  The Olympus video colonoscope was introduced per rectum which confirmed a large somewhat necrotic appearing mass that extended from the 9 o'clock position around to the 6 o'clock position and was approximately 10 cm in length.  At the rectosigmoid junction there was a 10 mm sessile polyp that appeared benign and was not removed because of its proximity to the malignancy.  The endoscope was then advanced without difficulty to the cecum to the ileocecal valve and appendiceal orifice were identified and normal.  On the ileocecal fold a 5 mm sessile polyp was identified and removed with cold snare.  The ascending colon transverse colon, descending colon and proximal sigmoid colon were normal.  Multiple biopsies were obtained from the rectal mass.  The scope was withdrawn.  Tolerated and the patient returned to the recovery area for observation.      Discharge plan this patient will be discharged to resume his regular diet today and normal activities tomorrow.  Arrangements are being made for him to be seen in the surgery clinic as quickly as possible.  A follow-up colonoscopy in 1 year is recommended.    OUTCOME: Patient tolerated treatment/procedure well without complication and is now ready for discharge.    DISPOSITION: Home or Self Care    FINAL DIAGNOSIS:  <principal problem not specified>    FOLLOWUP: In clinic    DISCHARGE INSTRUCTIONS:    Discharge Procedure Orders   Diet  general     Call MD for:  temperature >100.4     Call MD for:  persistent nausea and vomiting     Call MD for:  severe uncontrolled pain     Call MD for:  difficulty breathing, headache or visual disturbances     Activity as tolerated         Clinical Reference Documents Added to Patient Instructions         Document    COLONOSCOPY DISCHARGE INSTRUCTIONS (ENGLISH)          10  TIME SPENT ON DISCHARGE: 10 minutes

## 2023-05-04 NOTE — H&P
Colonoscopy History and Physical    Patient Name: Ashkan Mathews  MRN: [id number]  : 1960  Date of Procedure:  2023  Referring Physician: Placido Mckeon MD  Primary Physician: Analilia Ulloa MD  Procedure Physician: Placido Mckeon MD     Procedure - Colonoscopy  ASA - per anesthesia  Mallampati - per anesthesia  History of Anesthesia problems - no  Family history Anesthesia problems -  no   Plan of anesthesia - General    Diagnosis: rectal bleeding  Chief Complaint: Same as above    HPI: Patient is an 62 y.o. male is here for the above. The patient has a history of hemorrhoids and has had rectal bleeding for several years which he attributed to that problem.  Apparently a year or so ago his family physician told him that he had a rectal mass and tried to get him scheduled for a colonoscopy but apparently the procedure has been canceled several times.  Patient has no major problems with constipation and denies pain, burning irritation with bowel movements.  He is had no change in appetite or weight loss.  No family history of colon polyps, colon cancer or colitis.    Last colonoscopy: none     Family history: no  Anticoagulation: no    ROS:  Constitutional: No fevers, chills, No weight loss  CV: No chest pain  Pulm: No cough, No shortness of breath  GI: see HPI    Medical History:   Past Medical History:   Diagnosis Date    CAD (coronary artery disease)     HLD (hyperlipidemia)     HTN (hypertension)     MI (myocardial infarction)     Obesity, unspecified          Surgical History:   Past Surgical History:   Procedure Laterality Date    CORONARY ARTERY BYPASS GRAFT  2021    CORONARY STENT PLACEMENT         Family History:   History reviewed. No pertinent family history..    Social History:   Social History     Socioeconomic History    Marital status:     Number of children: 2   Tobacco Use    Smoking status: Former     Types: Cigarettes     Quit date:      Years since quitting:  "17.3   Substance and Sexual Activity    Alcohol use: Yes     Alcohol/week: 35.0 standard drinks     Types: 25 Cans of beer, 10 Shots of liquor per week    Drug use: Yes     Types: Marijuana     Comment: weekends       Review of patient's allergies indicates:  No Known Allergies    Medications:   Medications Prior to Admission   Medication Sig Dispense Refill Last Dose    amLODIPine (NORVASC) 5 MG tablet Take 5 mg by mouth once daily.   5/3/2023    hydroCHLOROthiazide (HYDRODIURIL) 25 MG tablet Take 25 mg by mouth.   5/3/2023    losartan (COZAAR) 100 MG tablet Take 100 mg by mouth every morning.   5/3/2023    metoprolol succinate (TOPROL-XL) 25 MG 24 hr tablet Take 50 mg by mouth nightly.   5/4/2023 at n    omeprazole (PRILOSEC) 40 MG capsule Take 40 mg by mouth.       polyethylene glycol (MOVIPREP) 100-7.5-2.691 gram solution Take a directed per instructions provided by GI Clinic. 1 kit 0          Physical Exam:    Vital Signs:   Vitals:    05/04/23 1329   BP: (!) 155/84   Pulse: 94   Resp:      BP (!) 155/84   Pulse 94   Resp 16   Ht 6' 2" (1.88 m)   Wt 103.9 kg (229 lb)   SpO2 96%   BMI 29.40 kg/m²     General:          Well appearing in no acute distress  Lungs: Clear to auscultation bilaterally, respirations unlabored  Heart: Regular rate and rhythm, S1 and S2 normal, no obvious murmurs  Abdomen:         Soft, non-tender, bowel sounds normal, no masses, no organomegaly        Labs:  Lab Results   Component Value Date    WBC 9.8 04/24/2023    HGB 13.6 (L) 04/24/2023    HCT 40.6 (L) 04/24/2023    MCV 85.5 04/24/2023     (H) 04/24/2023     Lab Results   Component Value Date    INR 1.10 04/24/2023     Lab Results   Component Value Date     (L) 04/24/2023    K 4.3 04/24/2023    CO2 26 04/24/2023    BUN 16.8 04/24/2023    CREATININE 1.16 04/24/2023    LABPROT 7.3 04/24/2023    ALBUMIN 3.0 (L) 04/24/2023    BILITOT 0.6 04/24/2023    ALKPHOS 134 04/24/2023    ALT 11 04/24/2023    AST 17 04/24/2023 "         Assessment and Plan:    History reviewed, vital signs satisfactory, cardiopulmonary status satisfactory.  I have explained the sedation options, risks, benefits, and alternatives of this endoscopic procedure to the patient including but not limited to bleeding, inflammation, infection, perforation, and death.  All questions were answered and the patient consented to proceed with procedure as planned.   The patient is deemed an appropriate candidate for the sedation as planned.      Placido Mckeon MD   of Clinical Medicine  Gastroenterology and Hepatology  [facility]    2023  1:57 PM Colonoscopy History and Physical    Patient Name: Ashkan Mathews  MRN: [id number]  : 1960  Date of Procedure:  2023  Referring Physician: Placido Mckeon MD  Primary Physician: Analilia Ulloa MD  Procedure Physician: Placido Mckeon MD     Procedure - Colonoscopy  ASA - per anesthesia  Mallampati - per anesthesia  History of Anesthesia problems - no  Family history Anesthesia problems -  no   Plan of anesthesia - General    Diagnosis: rectal bleeding  Chief Complaint: Same as above    HPI: Patient is an 62 y.o. male is here for the above.     Last colonoscopy: none   Family history: no  Anticoagulation: no    ROS:  Constitutional: No fevers, chills, No weight loss  CV: No chest pain  Pulm: No cough, No shortness of breath  GI: see HPI    Medical History:   Past Medical History:   Diagnosis Date    CAD (coronary artery disease)     HLD (hyperlipidemia)     HTN (hypertension)     MI (myocardial infarction)     Obesity, unspecified          Surgical History:   Past Surgical History:   Procedure Laterality Date    CORONARY ARTERY BYPASS GRAFT  2021    CORONARY STENT PLACEMENT         Family History:   History reviewed. No pertinent family history..    Social History:   Social History     Socioeconomic History    Marital status:     Number of  "children: 2   Tobacco Use    Smoking status: Former     Types: Cigarettes     Quit date:      Years since quittin.3   Substance and Sexual Activity    Alcohol use: Yes     Alcohol/week: 35.0 standard drinks     Types: 25 Cans of beer, 10 Shots of liquor per week    Drug use: Yes     Types: Marijuana     Comment: weekends       Review of patient's allergies indicates:  No Known Allergies    Medications:   Medications Prior to Admission   Medication Sig Dispense Refill Last Dose    amLODIPine (NORVASC) 5 MG tablet Take 5 mg by mouth once daily.   5/3/2023    hydroCHLOROthiazide (HYDRODIURIL) 25 MG tablet Take 25 mg by mouth.   5/3/2023    losartan (COZAAR) 100 MG tablet Take 100 mg by mouth every morning.   5/3/2023    metoprolol succinate (TOPROL-XL) 25 MG 24 hr tablet Take 50 mg by mouth nightly.   2023 at n    omeprazole (PRILOSEC) 40 MG capsule Take 40 mg by mouth.       polyethylene glycol (MOVIPREP) 100-7.5-2.691 gram solution Take a directed per instructions provided by GI Clinic. 1 kit 0          Physical Exam:    Vital Signs:   Vitals:    23 1329   BP: (!) 155/84   Pulse: 94   Resp:      BP (!) 155/84   Pulse 94   Resp 16   Ht 6' 2" (1.88 m)   Wt 103.9 kg (229 lb)   SpO2 96%   BMI 29.40 kg/m²     General:          Well appearing in no acute distress  Lungs: Clear to auscultation bilaterally, respirations unlabored  Heart: Regular rate and rhythm, S1 and S2 normal, no obvious murmurs  Abdomen:         Soft, non-tender, bowel sounds normal, no masses, no organomegaly        Labs:  Lab Results   Component Value Date    WBC 9.8 2023    HGB 13.6 (L) 2023    HCT 40.6 (L) 2023    MCV 85.5 2023     (H) 2023     Lab Results   Component Value Date    INR 1.10 2023     Lab Results   Component Value Date     (L) 2023    K 4.3 2023    CO2 26 2023    BUN 16.8 2023    CREATININE 1.16 2023    LABPROT 7.3 2023    " ALBUMIN 3.0 (L) 04/24/2023    BILITOT 0.6 04/24/2023    ALKPHOS 134 04/24/2023    ALT 11 04/24/2023    AST 17 04/24/2023         Assessment and Plan:    History reviewed, vital signs satisfactory, cardiopulmonary status satisfactory.  I have explained the sedation options, risks, benefits, and alternatives of this endoscopic procedure to the patient including but not limited to bleeding, inflammation, infection, perforation, and death.  All questions were answered and the patient consented to proceed with procedure as planned.   The patient is deemed an appropriate candidate for the sedation as planned.      Placido Mkceon MD, MPH   of Clinical Medicine  Gastroenterology and Hepatology  [facility]    5/4/2023  1:50 PM

## 2023-05-04 NOTE — TRANSFER OF CARE
"Anesthesia Transfer of Care Note    Patient: Ashkan Mathews    Procedure(s) Performed: Procedure(s) (LRB):  COLONOSCOPY, WITH POLYPECTOMY USING SNARE (N/A)  COLONOSCOPY, WITH 1 OR MORE BIOPSIES    Patient location: GI    Anesthesia Type: general    Transport from OR: Transported from OR on room air with adequate spontaneous ventilation    Post pain: adequate analgesia    Post assessment: no apparent anesthetic complications and tolerated procedure well    Post vital signs: stable    Level of consciousness: awake    Nausea/Vomiting: no nausea/vomiting    Complications: none    Transfer of care protocol was followedComments: Report to JOSE Sales      Last vitals:   Visit Vitals  /66   Pulse 81   Resp 16   Ht 6' 2" (1.88 m)   Wt 103.9 kg (229 lb)   SpO2 96%   BMI 29.40 kg/m²     "

## 2023-05-05 VITALS
HEART RATE: 71 BPM | HEIGHT: 74 IN | WEIGHT: 229 LBS | OXYGEN SATURATION: 96 % | DIASTOLIC BLOOD PRESSURE: 80 MMHG | SYSTOLIC BLOOD PRESSURE: 135 MMHG | RESPIRATION RATE: 18 BRPM | BODY MASS INDEX: 29.39 KG/M2

## 2023-05-09 ENCOUNTER — OFFICE VISIT (OUTPATIENT)
Dept: SURGERY | Facility: CLINIC | Age: 63
End: 2023-05-09

## 2023-05-09 VITALS
OXYGEN SATURATION: 98 % | BODY MASS INDEX: 29.26 KG/M2 | WEIGHT: 228 LBS | RESPIRATION RATE: 18 BRPM | SYSTOLIC BLOOD PRESSURE: 114 MMHG | DIASTOLIC BLOOD PRESSURE: 76 MMHG | HEIGHT: 74 IN | TEMPERATURE: 98 F | HEART RATE: 96 BPM

## 2023-05-09 DIAGNOSIS — K62.89 RECTAL MASS: ICD-10-CM

## 2023-05-09 PROCEDURE — 99215 OFFICE O/P EST HI 40 MIN: CPT | Mod: PBBFAC

## 2023-05-09 NOTE — PROGRESS NOTES
[facility] Note    HPI: 62 year old male with past medical history of CAD s/p CABG  presents for evaluation of rectal mass. He reports that since 1 year ago he has had rectal bleeding that presents with stool and is often dark and with clots, but can also be light as well. He had a colonoscopy last week which showed the mass as well as a polyp that was removed. He has had irregular bowel habits with different consistencies of blood in his stool. He has significant pain in his rectum and reports weight loss and fatigue, but no fever or chills, urinary issues, nausea, vomiting, or diarrhea.    No abdominal surgeries in the past. Takes a statin, antihypertensives and metoprolol. NKDA.     PMH:   Past Medical History:   Diagnosis Date    CAD (coronary artery disease)     HLD (hyperlipidemia)     HTN (hypertension)     MI (myocardial infarction)     Obesity, unspecified       Meds:   Current Outpatient Medications:     amLODIPine (NORVASC) 5 MG tablet, Take 5 mg by mouth once daily., Disp: , Rfl:     hydroCHLOROthiazide (HYDRODIURIL) 25 MG tablet, Take 25 mg by mouth., Disp: , Rfl:     losartan (COZAAR) 100 MG tablet, Take 100 mg by mouth every morning., Disp: , Rfl:     metoprolol succinate (TOPROL-XL) 25 MG 24 hr tablet, Take 50 mg by mouth nightly., Disp: , Rfl:     omeprazole (PRILOSEC) 40 MG capsule, Take 40 mg by mouth., Disp: , Rfl:     polyethylene glycol (MOVIPREP) 100-7.5-2.691 gram solution, Take a directed per instructions provided by GI Clinic., Disp: 1 kit, Rfl: 0  Allergies: Review of patient's allergies indicates:  No Known Allergies  Social History:   Social History     Tobacco Use    Smoking status: Former     Types: Cigarettes     Quit date:      Years since quittin.3   Substance Use Topics    Alcohol use: Yes     Alcohol/week: 35.0 standard drinks     Types: 25 Cans of beer, 10 Shots of liquor per week    Drug use: Yes     Types: Marijuana     Comment: weekends     Family  History: History reviewed. No pertinent family history.  Surgical History:   Past Surgical History:   Procedure Laterality Date    COLONOSCOPY, WITH 1 OR MORE BIOPSIES  5/4/2023    Procedure: COLONOSCOPY, WITH 1 OR MORE BIOPSIES;  Surgeon: Placido Mckeon MD;  Location: [facility] ENDOSCOPY;  Service: Endoscopy;;    COLONOSCOPY, WITH POLYPECTOMY USING SNARE N/A 5/4/2023    Procedure: COLONOSCOPY, WITH POLYPECTOMY USING SNARE;  Surgeon: Placido Mckeon MD;  Location: [facility] ENDOSCOPY;  Service: Endoscopy;  Laterality: N/A;    CORONARY ARTERY BYPASS GRAFT  04/2021    CORONARY STENT PLACEMENT       Review of Systems:  General: reports fatigue, weight loss; no fever or chills  Skin: No rashes or itching.  Head: Denies headache or recent trauma.  Eyes: Denies eye pain or double vision.  Neck: Denies swelling or hoarseness of voice.  Respiratory: Denies shortness of breath or chest pain  Cardiac: Denies palpitations or swelling in hands/feet.  Gastrointestinal: Denies nausea, denies vomiting. Hematochezia and melena with stool, and reports some pain per rectum  Urinary: Denies dysuria or hematuria.  Vascular: Denies claudication or leg swelling.  Neuro: Denies motor deficits. Denies weakness.  Endocrine: Denies excessive sweating or cold intolerance.  Psych: Denies memory problems. Denies anxiety.    Objective:    Vitals:  Vitals:    05/09/23 1243   BP: 114/76   Pulse: 96   Resp: 18   Temp: 97.7 °F (36.5 °C)        Physical Exam:  Gen: NAD  Neuro: awake, alert, answering questions appropriately  CV: RRR  Resp: non-labored breathing, SUREKHA  Abd: soft, ND, NT  Rectal exam: significant for mass on the left rectal wall  Ext: moves all 4 spontaneously and purposefully  Skin: warm, well perfused    Pertinent Labs:      Imaging:  Colonoscopy (5/4/2023)  The digital rectal exam revealed a 10 cm (diameter) firm rectal mass        palpated 1 to 2 cm from the anal verge. The mass which was an        obvious cancer was located between  the 11- and 7-o'clock positions        (with respect to the circumference of the bowel).Multiple biopsies        were obtained.        A 5 mm polyp was found in the proximal ascending colon. The polyp        was sessile. The polyp was removed with a cold snare. Resection and        retrieval were complete.        A 10 mm polyp was found in the recto-sigmoid colon. The polyp was        sessile. This polyp was not removed.   Impression:            - Rectal mass 0 to 2 cm from the anal verge.                          - One 5 mm polyp in the proximal ascending colon,                          removed with a cold snare. Resected and retrieved.                          - One 10 mm polyp at the recto-sigmoid colon.   Recommendation:        - Repeat colonoscopy in 1 year for surveillance.                          - Refer to a surgeon at the next available                          appointment.                          - Discharge patient to home     CT abdomen pelvis 4/24:  Findings seen consistent with malignancy in the rectum with metastatic pelvic lymphadenopathy seen and metastatic foci seen throughout the liver      Assessment/Plan:  62 year old male with newly diagnosed rectal cancer with metastasis to the liver     - Referred to oncology  -return to clinic in 1 month to discuss surgical options    Priscilla Harp MS3  05/09/2023 1:29 PM     62 year old male with newly diagnosed rectal cancer. Needs CEA, pelvic MRI, CT chest (all ordered). Path not back yet from c-scope. Needs med onc eval given stage IV disease.  Return to surgery clinic in a few weeks after imaging completed and he has seen onc to discuss next steps in treatment.   KADEN Mann MD PGY5

## 2023-05-09 NOTE — PROGRESS NOTES
Pt seen by LUISA Mann; Referral to oncology placed; CT & MRI ordered & pt given centralized scheduling information sheet; Pt instructed to return in 1 month; Discharge paperwork given w/pt verbalizing understanding

## 2023-05-16 LAB
DHEA SERPL-MCNC: NORMAL
ESTRIOL SERPL-MCNC: NORMAL NG/ML
ESTROGEN SERPL-MCNC: NORMAL PG/ML
INSULIN SERPL-ACNC: NORMAL U[IU]/ML
LAB AP CLINICAL INFORMATION: NORMAL
LAB AP GROSS DESCRIPTION: NORMAL
LAB AP REPORT FOOTNOTES: NORMAL
T3RU NFR SERPL: NORMAL %

## 2023-05-22 ENCOUNTER — TELEPHONE (OUTPATIENT)
Dept: ENDOSCOPY | Facility: HOSPITAL | Age: 63
End: 2023-05-22

## 2023-05-22 NOTE — TELEPHONE ENCOUNTER
----- Message from Placido Mckeon MD sent at 5/18/2023  2:29 PM CDT -----  Recall colonoscopy in 1 year..Biopsy confirms Nix Syndrome.

## 2023-05-24 ENCOUNTER — DOCUMENTATION ONLY (OUTPATIENT)
Dept: HEMATOLOGY/ONCOLOGY | Facility: CLINIC | Age: 63
End: 2023-05-24

## 2023-05-24 NOTE — NURSING
Contacted patient for pre-visit phone contact. JOSE Garcia introduced self, confirmed appointment on 5/26/2023 at 9:40 am with arrival 15 minutes prior to appointment, and what to expect. Verbalized agreement. Patient reports he is having a lot of pain at this time. Informed patient that he will be evaluated by the medical oncologist to determine best treatment. Patient states he is not sure he will be able to walk to clinic. JOSE Garcia verified that he has someone who will transport him to [facility] and for patient to request a wheelchair upon arrival. Patient voiced understanding.     Sophia Fiore, ROSALEEN, RN

## 2023-05-26 ENCOUNTER — OFFICE VISIT (OUTPATIENT)
Dept: HEMATOLOGY/ONCOLOGY | Facility: CLINIC | Age: 63
End: 2023-05-26
Attending: INTERNAL MEDICINE

## 2023-05-26 ENCOUNTER — DOCUMENTATION ONLY (OUTPATIENT)
Dept: HEMATOLOGY/ONCOLOGY | Facility: CLINIC | Age: 63
End: 2023-05-26

## 2023-05-26 ENCOUNTER — TELEPHONE (OUTPATIENT)
Dept: HEMATOLOGY/ONCOLOGY | Facility: CLINIC | Age: 63
End: 2023-05-26

## 2023-05-26 VITALS
SYSTOLIC BLOOD PRESSURE: 121 MMHG | OXYGEN SATURATION: 98 % | HEART RATE: 68 BPM | TEMPERATURE: 99 F | BODY MASS INDEX: 28.49 KG/M2 | HEIGHT: 74 IN | DIASTOLIC BLOOD PRESSURE: 75 MMHG | RESPIRATION RATE: 20 BRPM | WEIGHT: 222 LBS

## 2023-05-26 DIAGNOSIS — C78.7 ADENOCARCINOMA OF RECTUM METASTATIC TO LIVER: Primary | ICD-10-CM

## 2023-05-26 DIAGNOSIS — C20 ADENOCARCINOMA OF RECTUM METASTATIC TO LIVER: Primary | ICD-10-CM

## 2023-05-26 DIAGNOSIS — C20 ADENOCARCINOMA OF RECTUM, STAGE 4: ICD-10-CM

## 2023-05-26 DIAGNOSIS — C20 ADENOCARCINOMA OF RECTUM METASTATIC TO LIVER: ICD-10-CM

## 2023-05-26 DIAGNOSIS — C20 RECTAL CANCER: ICD-10-CM

## 2023-05-26 DIAGNOSIS — K62.89 RECTAL PAIN: ICD-10-CM

## 2023-05-26 DIAGNOSIS — R63.5 UNINTENDED WEIGHT GAIN: ICD-10-CM

## 2023-05-26 DIAGNOSIS — K62.5 RECTAL BLEEDING: ICD-10-CM

## 2023-05-26 DIAGNOSIS — R19.8 TENESMUS: ICD-10-CM

## 2023-05-26 DIAGNOSIS — R64 CANCER CACHEXIA: ICD-10-CM

## 2023-05-26 DIAGNOSIS — R59.0 PELVIC LYMPHADENOPATHY: ICD-10-CM

## 2023-05-26 DIAGNOSIS — K62.89 RECTAL MASS: ICD-10-CM

## 2023-05-26 DIAGNOSIS — R63.0 ANOREXIA: ICD-10-CM

## 2023-05-26 DIAGNOSIS — C78.7 ADENOCARCINOMA OF RECTUM METASTATIC TO LIVER: ICD-10-CM

## 2023-05-26 LAB
ALBUMIN SERPL-MCNC: 3 G/DL (ref 3.4–4.8)
ALBUMIN/GLOB SERPL: 0.6 RATIO (ref 1.1–2)
ALP SERPL-CCNC: 254 UNIT/L (ref 40–150)
ALT SERPL-CCNC: 16 UNIT/L (ref 0–55)
AST SERPL-CCNC: 27 UNIT/L (ref 5–34)
BASOPHILS # BLD AUTO: 0.13 X10(3)/MCL
BASOPHILS NFR BLD AUTO: 1 %
BILIRUBIN DIRECT+TOT PNL SERPL-MCNC: 1 MG/DL
BUN SERPL-MCNC: 16 MG/DL (ref 8.4–25.7)
CALCIUM SERPL-MCNC: 9.8 MG/DL (ref 8.8–10)
CEA SERPL-MCNC: 264.87 NG/ML (ref 0–3)
CHLORIDE SERPL-SCNC: 98 MMOL/L (ref 98–107)
CO2 SERPL-SCNC: 23 MMOL/L (ref 23–31)
CREAT SERPL-MCNC: 1.06 MG/DL (ref 0.73–1.18)
EOSINOPHIL # BLD AUTO: 0.1 X10(3)/MCL (ref 0–0.9)
EOSINOPHIL NFR BLD AUTO: 0.8 %
ERYTHROCYTE [DISTWIDTH] IN BLOOD BY AUTOMATED COUNT: 12.2 % (ref 11.5–17)
GFR SERPLBLD CREATININE-BSD FMLA CKD-EPI: >60 MLS/MIN/1.73/M2
GLOBULIN SER-MCNC: 4.9 GM/DL (ref 2.4–3.5)
GLUCOSE SERPL-MCNC: 104 MG/DL (ref 82–115)
HCT VFR BLD AUTO: 34.1 % (ref 42–52)
HGB BLD-MCNC: 11.4 G/DL (ref 14–18)
IMM GRANULOCYTES # BLD AUTO: 0.06 X10(3)/MCL (ref 0–0.04)
IMM GRANULOCYTES NFR BLD AUTO: 0.5 %
LYMPHOCYTES # BLD AUTO: 1.7 X10(3)/MCL (ref 0.6–4.6)
LYMPHOCYTES NFR BLD AUTO: 13.1 %
MAGNESIUM SERPL-MCNC: 2.2 MG/DL (ref 1.6–2.6)
MCH RBC QN AUTO: 27.7 PG (ref 27–31)
MCHC RBC AUTO-ENTMCNC: 33.4 G/DL (ref 33–36)
MCV RBC AUTO: 82.8 FL (ref 80–94)
MONOCYTES # BLD AUTO: 1.02 X10(3)/MCL (ref 0.1–1.3)
MONOCYTES NFR BLD AUTO: 7.9 %
NEUTROPHILS # BLD AUTO: 9.94 X10(3)/MCL (ref 2.1–9.2)
NEUTROPHILS NFR BLD AUTO: 76.7 %
NRBC BLD AUTO-RTO: 0 %
PLATELET # BLD AUTO: 635 X10(3)/MCL (ref 130–400)
PMV BLD AUTO: 9.7 FL (ref 7.4–10.4)
POTASSIUM SERPL-SCNC: 4 MMOL/L (ref 3.5–5.1)
PROT SERPL-MCNC: 7.9 GM/DL (ref 5.8–7.6)
RBC # BLD AUTO: 4.12 X10(6)/MCL (ref 4.7–6.1)
SODIUM SERPL-SCNC: 134 MMOL/L (ref 136–145)
WBC # SPEC AUTO: 12.95 X10(3)/MCL (ref 4.5–11.5)

## 2023-05-26 PROCEDURE — 36415 COLL VENOUS BLD VENIPUNCTURE: CPT | Performed by: INTERNAL MEDICINE

## 2023-05-26 PROCEDURE — 99205 PR OFFICE/OUTPT VISIT, NEW, LEVL V, 60-74 MIN: ICD-10-PCS | Mod: S$PBB,,, | Performed by: INTERNAL MEDICINE

## 2023-05-26 PROCEDURE — 85025 COMPLETE CBC W/AUTO DIFF WBC: CPT | Performed by: INTERNAL MEDICINE

## 2023-05-26 PROCEDURE — 99205 OFFICE O/P NEW HI 60 MIN: CPT | Mod: S$PBB,,, | Performed by: INTERNAL MEDICINE

## 2023-05-26 PROCEDURE — 80053 COMPREHEN METABOLIC PANEL: CPT | Performed by: INTERNAL MEDICINE

## 2023-05-26 PROCEDURE — 83735 ASSAY OF MAGNESIUM: CPT | Performed by: INTERNAL MEDICINE

## 2023-05-26 PROCEDURE — 99215 OFFICE O/P EST HI 40 MIN: CPT | Mod: PBBFAC | Performed by: INTERNAL MEDICINE

## 2023-05-26 PROCEDURE — 82378 CARCINOEMBRYONIC ANTIGEN: CPT | Performed by: INTERNAL MEDICINE

## 2023-05-26 RX ORDER — ONDANSETRON 4 MG/1
4 TABLET, FILM COATED ORAL EVERY 6 HOURS PRN
Qty: 30 TABLET | Refills: 1 | Status: SHIPPED | OUTPATIENT
Start: 2023-05-26 | End: 2024-05-25

## 2023-05-26 RX ORDER — MEGESTROL ACETATE 40 MG/ML
800 SUSPENSION ORAL DAILY
Qty: 600 ML | Refills: 1 | Status: SHIPPED | OUTPATIENT
Start: 2023-05-26 | End: 2024-05-25

## 2023-05-26 RX ORDER — HEPARIN 100 UNIT/ML
500 SYRINGE INTRAVENOUS
Status: CANCELLED | OUTPATIENT
Start: 2023-05-31

## 2023-05-26 RX ORDER — HYDROCODONE BITARTRATE AND ACETAMINOPHEN 10; 325 MG/1; MG/1
1 TABLET ORAL EVERY 4 HOURS PRN
Qty: 60 TABLET | Refills: 0 | Status: SHIPPED | OUTPATIENT
Start: 2023-05-26 | End: 2023-06-26 | Stop reason: SDUPTHER

## 2023-05-26 RX ORDER — DIPHENHYDRAMINE HYDROCHLORIDE 50 MG/ML
50 INJECTION INTRAMUSCULAR; INTRAVENOUS ONCE AS NEEDED
Status: CANCELLED | OUTPATIENT
Start: 2023-05-31

## 2023-05-26 RX ORDER — EPINEPHRINE 0.3 MG/.3ML
0.3 INJECTION SUBCUTANEOUS ONCE AS NEEDED
Status: CANCELLED | OUTPATIENT
Start: 2023-05-31

## 2023-05-26 RX ORDER — FLUOROURACIL 50 MG/ML
2400 INJECTION, SOLUTION INTRAVENOUS
Status: CANCELLED | OUTPATIENT
Start: 2023-05-31

## 2023-05-26 RX ORDER — SODIUM CHLORIDE 0.9 % (FLUSH) 0.9 %
10 SYRINGE (ML) INJECTION
Status: CANCELLED | OUTPATIENT
Start: 2023-06-02

## 2023-05-26 RX ORDER — HEPARIN 100 UNIT/ML
500 SYRINGE INTRAVENOUS
Status: CANCELLED | OUTPATIENT
Start: 2023-06-02

## 2023-05-26 RX ORDER — FLUOROURACIL 50 MG/ML
400 INJECTION, SOLUTION INTRAVENOUS
Status: CANCELLED | OUTPATIENT
Start: 2023-05-31

## 2023-05-26 RX ORDER — SODIUM CHLORIDE 0.9 % (FLUSH) 0.9 %
10 SYRINGE (ML) INJECTION
Status: CANCELLED | OUTPATIENT
Start: 2023-05-31

## 2023-05-26 NOTE — Clinical Note
Start Norco 10 mg every 4 hours PRN for rectal pain Start Zofran 4 mg q.6 hours PRN for nausea  Start Megace 800 mg p.o. q.day to boost appetite  Refer to surgery for MediPort placement for chemotherapy  MRI scan of pelvis with and without contrast for evaluation of rectal cancer

## 2023-05-26 NOTE — Clinical Note
Orders for today:  Check CBC, CMP, CEA level  Contrast-enhanced CT scans of C/A/P for evaluation of metastatic disease   Refer to IR ASAP for biopsy of liver lesions   On liver biopsy, please order the following: KRAS mutation, NRAS mutation, BRAF mutation, MSI/MMR testing, HER2 testing  NGS testing  Follow-up in 2 weeks.

## 2023-05-26 NOTE — NURSING
Met with patient today after his consult appointment with Dr. Guevara to introduce self. Provided patient with RN Jose contact and explained role in patient's care. Informed patient that JOSE Garcia will follow up with patient for education and resources within 2 weeks.     Oncology Navigation   Intake  Cancer Type: GI  Initial Nurse Navigator Contact: 05/25/23  Appointment Date: 05/26/23     Treatment  Current Status: Active       Medical Oncologist: Jamaal Guevara MD  Consult Date: 05/26/23  Chemotherapy: Planned  Chemotherapy Regimen: Avastin/FOLFOX       Procedures: Biopsy                 Acuity      Follow Up  Follow up in about 1 week (around 6/2/2023) for npo information.

## 2023-05-26 NOTE — PROGRESS NOTES
History:  Past Medical History:   Diagnosis Date    CAD (coronary artery disease)     HLD (hyperlipidemia)     HTN (hypertension)     MI (myocardial infarction)     Obesity, unspecified    Past medical history:  CAD, S/P CABG x3 at [facility] after an MI in ; prior to that, he had PCI mixed dyslipidemia.  Primary hypertension.  History of MI. GERD.  Social history:  Single.  Lives in [place], [place].  Has a 29-year-old son and a 22-year-old daughter.  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  Has been chewing tobacco for 40 years.  Does not smoke.  No alcohol or illicit drug abuse.    Family history:  Negative for cancers.  Health maintenance:  Primary care provider in [place].  Had never had screening colonoscopy performed prior to the most recent colonoscopy leading to diagnosis of rectal cancer.  Past Surgical History:   Procedure Laterality Date    COLONOSCOPY, WITH 1 OR MORE BIOPSIES  2023    Procedure: COLONOSCOPY, WITH 1 OR MORE BIOPSIES;  Surgeon: Placido Mckeon MD;  Location: [facility] ENDOSCOPY;  Service: Endoscopy;;    COLONOSCOPY, WITH POLYPECTOMY USING SNARE N/A 2023    Procedure: COLONOSCOPY, WITH POLYPECTOMY USING SNARE;  Surgeon: Placido Mckeon MD;  Location: [facility] ENDOSCOPY;  Service: Endoscopy;  Laterality: N/A;    CORONARY ARTERY BYPASS GRAFT  2021    CORONARY STENT PLACEMENT        Social History     Socioeconomic History    Marital status:     Number of children: 2   Tobacco Use    Smoking status: Former     Types: Cigarettes     Quit date:      Years since quittin.4   Substance and Sexual Activity    Alcohol use: Yes     Alcohol/week: 35.0 standard drinks     Types: 25 Cans of beer, 10 Shots of liquor per week    Drug use: Yes     Types: Marijuana     Comment: weekends      History reviewed. No pertinent family history.     Reason for Follow-up:  Reason for follow-up:  -adenocarcinoma of rectum, MMR proficient, diagnosed on colonoscopy  05/04/2023   -metastatic pelvic lymphadenopathy on CT abdomen pelvis with contrast 04/24/2023   -too numerous to count multiple liver metastases, largest 5.1 cm, on CT scans of abdomen pelvis with contrast 04/24/2023    History of Present Illness:   Anemia        Oncologic/Hematologic History:  Oncology History    No history exists.   62-year-old gentleman, referred from [facility] surgery, with rectal mass.      05/09/2023: Surgery note:   '62 year old male with past medical history of CAD s/p CABG 2021 presents for evaluation of rectal mass. He reports that since 1 year ago he has had rectal bleeding that presents with stool and is often dark and with clots, but can also be light as well. He had a colonoscopy last week which showed the mass as well as a polyp that was removed. He has had irregular bowel habits with different consistencies of blood in his stool. He has significant pain in his rectum and reports weight loss and fatigue, but no fever or chills, urinary issues, nausea, vomiting, or diarrhea.   No abdominal surgeries in the past. Takes a statin, antihypertensives and metoprolol. NKDA.'    Investigations reviewed:    04/24/2023: CT A/P with contrast (GI bleed, rectal bleed for 1 year, lower abdominal pain)  -Findings seen consistent with malignancy in the rectum with metastatic pelvic lymphadenopathy seen and metastatic foci seen throughout the liver  (Multiple liver masses, too numerous to count, predominantly hypoattenuated, consistent with metastatic foci; reference largest liver lesion segment 3, 5.1 x 5.1 cm; circumferential wall thickening in the region of the rectum with a mass in the region of rectum; multiple lymph nodes in the pelvis, largest lymph nodes in the right deep hemipelvis 1.4 x 2 cm)    05/04/2023: Colonoscopy (hematochezia, suspecting colorectal cancer):  -rectal mass 0-2 cm from anal verge (10 cm diameter firm rectal mass 1-2 cm from anal verge; located between 11-and 7-O'clock  positions)  -5 mm polyp proximal ascending colon, sessile, removed  -10 mm polyp rectosigmoid colon, sessile, not removed  -repeat colonoscopy in 1 year for surveillance    05/04/2023:  1. Ileocecal valve colon polyp, polypectomy:  Tubular adenoma; no malignancy  2. Rectal mass, biopsy:  Adenocarcinoma; MMR proficient    Labs reviewed:  04/24/2023:  Hemoglobin 13.6.  Platelets 444 K. CMP essentially unremarkable.  Albumin 3.0.    05/26/2023:   Very pleasant gentleman who presents for initial medical oncology consultation, accompanied by his aunt.  # it all started with rectal bleeding, 1st noticed on 05/04/2022.  Used to notice blood clots in the toilet bowel, as well as blood on toilet vipes.  Says that he has been waiting for diagnostic colonoscopy but could not get an earlier appointment.  # for last 5 weeks, rectal pain and lower abdominal pain has worsened to the point of being 20/10 in intensity, on a scale of 1-10.  Currently, he is not on any pain medications.    # lately, rectal bleeding has diminished  # complains of nausea with pain  # anorexia.  Has lost 30 lb in last 3 months.  Has a craving for crawfish.  Can not eat anything else.    # progressive fatigue.  Now, ECOG 2.  # can not sit for a long period of time because of rectal pain.    # tenesmus.  Has to go to bathroom multiple X, up to 30 5 X, but passes very small amount of stool.  Loose stools.  # no abdominal distention, chest pain, cough, dyspnea, etc..  No lumps or lymphadenopathy.      Interval History:  [No matching plan found]   [No matching plan found]     Medications:  Current Outpatient Medications on File Prior to Visit   Medication Sig Dispense Refill    amLODIPine (NORVASC) 5 MG tablet Take 5 mg by mouth once daily.      hydroCHLOROthiazide (HYDRODIURIL) 25 MG tablet Take 25 mg by mouth.      losartan (COZAAR) 100 MG tablet Take 100 mg by mouth every morning.      metoprolol succinate (TOPROL-XL) 25 MG 24 hr tablet Take 50 mg by mouth  nightly.      omeprazole (PRILOSEC) 40 MG capsule Take 40 mg by mouth.      polyethylene glycol (MOVIPREP) 100-7.5-2.691 gram solution Take a directed per instructions provided by GI Clinic. 1 kit 0     No current facility-administered medications on file prior to visit.       Review of Systems:   All systems reviewed and found to be negative except for the symptoms detailed above    Physical Examination:   VITAL SIGNS:   Vitals:    05/26/23 0957   BP: 121/75   Pulse: 68   Resp: 20   Temp: 98.5 °F (36.9 °C)     GENERAL:  In no apparent distress.    HEAD:  No signs of head trauma.  EYES:  Pupils are equal.  Extraocular motions intact.    EARS:  Hearing grossly intact.  MOUTH:  Oropharynx is normal.   NECK:  No adenopathy, no JVD.     CHEST:  Chest with clear breath sounds bilaterally.  No wheezes, rales, rhonchi.    CARDIAC:  Regular rate and rhythm.  S1 and S2, without murmurs, gallops, rubs.  VASCULAR:  No Edema.  Peripheral pulses normal and equal in all extremities.  ABDOMEN:  Soft, without detectable tenderness.  No sign of distention.  No   rebound or guarding, and no masses palpated.   Bowel Sounds normal.  MUSCULOSKELETAL:  Good range of motion of all major joints. Extremities without clubbing, cyanosis or edema.    NEUROLOGIC EXAM:  Alert and oriented x 3.  No focal sensory or strength deficits.   Speech normal.  Follows commands.  PSYCHIATRIC:  Mood normal.    No results for input(s): CBC in the last 72 hours.   No results for input(s): CMP in the last 72 hours.     Assessment:  Problem List Items Addressed This Visit    None  Visit Diagnoses       Rectal mass              Metastatic adenocarcinoma rectum:  -hematochezia for 1 year, starting 05/2022; according to him, he could not get a sooner appointment for colonoscopy  -adenocarcinoma of rectum, MMR proficient, diagnosed on colonoscopy 05/04/2023   -metastatic pelvic lymphadenopathy on CT abdomen pelvis with contrast 04/24/2023   -too numerous to count  multiple liver metastases, largest 5.1 cm, on CT scans of abdomen pelvis with contrast 04/24/2023  -rectal pain, 20/10 severity; nausea; 30 lb weight loss over 3 months; anorexia; declining performance status, ECOG 2      Plan:  Check CBC, CMP, CEA level    Contrast-enhanced CT scans of C/A/P for evaluation of metastatic disease     Refer to IR ASAP for biopsy of liver lesions     On liver biopsy, please order the following:  KRAS mutation, NRAS mutation, BRAF mutation, MSI/MMR testing, HER2 testing   NGS testing    Start Norco 10 mg every 4 hours PRN for rectal pain  Start Zofran 4 mg q.6 hours PRN for nausea   Start Megace 800 mg p.o. q.day to boost appetite   Refer to surgery for MediPort placement for chemotherapy   MRI scan of pelvis with and without contrast for evaluation of rectal cancer    Will need palliative systemic therapy.  Biomarker testing will help.  Today, I will enter orders for Avastin/FOLFOX into our computer system so that by the time he is ready for chemotherapy, our office will have obtained authorization.    Given the metastatic disease, resection of rectal tumor is out of question.    If symptoms including pain and bleeding iron controlled, then, at some point, we may consider palliative radiotherapy rectal tumor.  For now, we will watch with medical management.      Follow-up in 2 weeks.      Above discussed at length with the patient and his own.  All questions answered.    Discussed labs and scans and gave him copies of relevant reports.    Discussed reports of CT scans and gave him copies.    Staging of rectal cancer discussed.    Clearly, candidly, and empathetic only discussed the following:  That, pending biopsy confirmation, he has stage IV disease; it is incurable; however, palliation can be attempted with systemic therapy.  Response to chemotherapy can not be guaranteed.      He and his aunt understand and agree with this plan, and were very much appreciative.    Follow-up:  No  follow-ups on file.

## 2023-05-29 ENCOUNTER — TELEPHONE (OUTPATIENT)
Dept: INTERVENTIONAL RADIOLOGY/VASCULAR | Facility: HOSPITAL | Age: 63
End: 2023-05-29

## 2023-05-31 ENCOUNTER — HOSPITAL ENCOUNTER (OUTPATIENT)
Dept: RADIOLOGY | Facility: HOSPITAL | Age: 63
Discharge: HOME | End: 2023-05-31
Attending: STUDENT IN AN ORGANIZED HEALTH CARE EDUCATION/TRAINING PROGRAM

## 2023-05-31 DIAGNOSIS — K62.89 RECTAL MASS: ICD-10-CM

## 2023-05-31 PROCEDURE — A9577 INJ MULTIHANCE: HCPCS

## 2023-05-31 PROCEDURE — 25500020 PHARM REV CODE 255

## 2023-05-31 PROCEDURE — 71260 CT THORAX DX C+: CPT | Mod: TC

## 2023-05-31 PROCEDURE — 72197 MRI PELVIS W/O & W/DYE: CPT | Mod: TC

## 2023-05-31 RX ADMIN — GADOBENATE DIMEGLUMINE 20 ML: 529 INJECTION, SOLUTION INTRAVENOUS at 09:05

## 2023-05-31 RX ADMIN — IOHEXOL 100 ML: 350 INJECTION, SOLUTION INTRAVENOUS at 08:05

## 2023-06-01 ENCOUNTER — OFFICE VISIT (OUTPATIENT)
Dept: SURGERY | Facility: CLINIC | Age: 63
End: 2023-06-01
Attending: INTERNAL MEDICINE
Payer: MEDICAID

## 2023-06-01 VITALS
DIASTOLIC BLOOD PRESSURE: 70 MMHG | OXYGEN SATURATION: 98 % | BODY MASS INDEX: 27.21 KG/M2 | HEART RATE: 75 BPM | SYSTOLIC BLOOD PRESSURE: 105 MMHG | TEMPERATURE: 97 F | HEIGHT: 74 IN | WEIGHT: 212 LBS

## 2023-06-01 DIAGNOSIS — C20 ADENOCARCINOMA OF RECTUM, STAGE 4: ICD-10-CM

## 2023-06-01 DIAGNOSIS — C78.7 ADENOCARCINOMA OF RECTUM METASTATIC TO LIVER: ICD-10-CM

## 2023-06-01 DIAGNOSIS — C20 RECTAL CANCER: ICD-10-CM

## 2023-06-01 DIAGNOSIS — C20 ADENOCARCINOMA OF RECTUM METASTATIC TO LIVER: ICD-10-CM

## 2023-06-01 PROCEDURE — 99215 OFFICE O/P EST HI 40 MIN: CPT | Mod: PBBFAC | Performed by: SURGERY

## 2023-06-01 RX ORDER — HEPARIN SODIUM 5000 [USP'U]/ML
5000 INJECTION, SOLUTION INTRAVENOUS; SUBCUTANEOUS EVERY 8 HOURS
Status: CANCELLED | OUTPATIENT
Start: 2023-06-01

## 2023-06-01 RX ORDER — SODIUM CHLORIDE 9 MG/ML
INJECTION, SOLUTION INTRAVENOUS CONTINUOUS
Status: CANCELLED | OUTPATIENT
Start: 2023-06-01

## 2023-06-01 NOTE — PROGRESS NOTES
[facility] General Surgery Clinic Note    HPI:   61 yo male with PMHx CAD s/p CABGx3 2021, MI, HLD, HTN, obesity presents for evaluation before mediport placement for chemotherapy for metastatic adenocarcinoma of the rectum.    Pt reports he has had recent dizziness associated with sweating and weakness when sitting up or standing that is improved with laying down.  He notes that his appetite has been decreased lately. He is having 2-3 soft, small bowel movements per day.  Denies constipation, denies small caliber stool.   He has never had a central line or any other vascular access.    PMH:   Past Medical History:   Diagnosis Date    CAD (coronary artery disease)     Cancer     HLD (hyperlipidemia)     HTN (hypertension)     MI (myocardial infarction)     Obesity, unspecified       Meds:   Current Outpatient Medications:     amLODIPine (NORVASC) 5 MG tablet, Take 5 mg by mouth once daily., Disp: , Rfl:     hydroCHLOROthiazide (HYDRODIURIL) 25 MG tablet, Take 25 mg by mouth., Disp: , Rfl:     HYDROcodone-acetaminophen (NORCO)  mg per tablet, Take 1 tablet by mouth every 4 (four) hours as needed for Pain., Disp: 60 tablet, Rfl: 0    losartan (COZAAR) 100 MG tablet, Take 100 mg by mouth every morning., Disp: , Rfl:     megestroL (MEGACE) 400 mg/10 mL (40 mg/mL) Susp, Take 20 mLs (800 mg total) by mouth once daily., Disp: 600 mL, Rfl: 1    metoprolol succinate (TOPROL-XL) 25 MG 24 hr tablet, Take 50 mg by mouth nightly., Disp: , Rfl:     omeprazole (PRILOSEC) 40 MG capsule, Take 40 mg by mouth., Disp: , Rfl:     ondansetron (ZOFRAN) 4 MG tablet, Take 1 tablet (4 mg total) by mouth every 6 (six) hours as needed for Nausea., Disp: 30 tablet, Rfl: 1    polyethylene glycol (MOVIPREP) 100-7.5-2.691 gram solution, Take a directed per instructions provided by GI Clinic., Disp: 1 kit, Rfl: 0  Allergies: Review of patient's allergies indicates:  No Known Allergies  Social History:   Social History     Tobacco Use    Smoking  status: Former     Types: Cigarettes     Quit date:      Years since quittin.4   Substance Use Topics    Alcohol use: Not Currently     Alcohol/week: 35.0 standard drinks     Types: 25 Cans of beer, 10 Shots of liquor per week    Drug use: Not Currently     Types: Marijuana     Comment: weekends     Family History: History reviewed. No pertinent family history.  Surgical History:   Past Surgical History:   Procedure Laterality Date    COLONOSCOPY, WITH 1 OR MORE BIOPSIES  2023    Procedure: COLONOSCOPY, WITH 1 OR MORE BIOPSIES;  Surgeon: Placido Mckeon MD;  Location: [facility] ENDOSCOPY;  Service: Endoscopy;;    COLONOSCOPY, WITH POLYPECTOMY USING SNARE N/A 2023    Procedure: COLONOSCOPY, WITH POLYPECTOMY USING SNARE;  Surgeon: Placido Mckeon MD;  Location: [facility] ENDOSCOPY;  Service: Endoscopy;  Laterality: N/A;    CORONARY ARTERY BYPASS GRAFT  2021    CORONARY STENT PLACEMENT       Review of Systems:  See HPI.    Objective:    Vitals:  Vitals:    23 1115   BP: 105/70   Pulse: 75   Temp: 97.4 °F (36.3 °C)        Physical Exam:  Gen: NAD  Neuro: awake, alert, answering questions appropriately  CV: RRR, palpable radial pulses  Resp: non-labored breathing, SUREKHA  Ext: moves all 4 spontaneously and purposefully  Skin: warm, well perfused    Pertinent Labs:  CEA 23 264.87  CBC 23 with WBC 12.95, Hg 11.4, HCT 34.1.    Imaging:  MRI Rectal cancer 23  There is large mid to distal rectal mass which is essentially circumferential. The most distal extent of the mass is approximately 5 mm from the anal verge.  The mass measures approximately 85 mm in length.  There is extension through the muscularis propria.  Mass abuts the pelvic floor on the right but no convincing invasion of it.  No convincing involvement of the prostate.  There is a large right perirectal metastatic deposit image 15 series 6 measuring 25 mm.  I count approximately 10 perirectal lymph nodes overall.     Mild bladder  wall thickening may relate to chronic outlet obstruction.  No pelvic free fluid.  No enlarged inguinal lymph nodes.  No suspicious marrow signal abnormality.    Micro/Path/Other:  1. Ileocecal valve colon polyp, Polypectomy:  - Tubular adenoma.  - No evidence of malignancy.    2. Rectal mass, biopsy:  - Adenocarcinoma.        Assessment/Plan:  61 yo male Pmhx CAD s/p 3 stents in 2021, HTN, HLD with current metastatic rectal adenocarcinoma presents for evaluation of mediport placement    - Consented and scheduled pt for mediport placement on Monday, 6/5  - Explained the risks, benefits, alternatives of the procedure and pt voices understanding and consent  - [facility] contacted for cardiology clinic    Robbie Edmonds  [employer] [employer], MS[employer]  06/01/2023    Patient seen and examined with medical student. Appropriate changes made to note. Agree with assessment and plan above.     Caroline Love MD   [facility] General Surgery, PGY-1

## 2023-06-01 NOTE — H&P (VIEW-ONLY)
[facility] General Surgery Clinic Note    HPI:   61 yo male with PMHx CAD s/p CABGx3 2021, MI, HLD, HTN, obesity presents for evaluation before mediport placement for chemotherapy for metastatic adenocarcinoma of the rectum.    Pt reports he has had recent dizziness associated with sweating and weakness when sitting up or standing that is improved with laying down.  He notes that his appetite has been decreased lately. He is having 2-3 soft, small bowel movements per day.  Denies constipation, denies small caliber stool.   He has never had a central line or any other vascular access.    PMH:   Past Medical History:   Diagnosis Date    CAD (coronary artery disease)     Cancer     HLD (hyperlipidemia)     HTN (hypertension)     MI (myocardial infarction)     Obesity, unspecified       Meds:   Current Outpatient Medications:     amLODIPine (NORVASC) 5 MG tablet, Take 5 mg by mouth once daily., Disp: , Rfl:     hydroCHLOROthiazide (HYDRODIURIL) 25 MG tablet, Take 25 mg by mouth., Disp: , Rfl:     HYDROcodone-acetaminophen (NORCO)  mg per tablet, Take 1 tablet by mouth every 4 (four) hours as needed for Pain., Disp: 60 tablet, Rfl: 0    losartan (COZAAR) 100 MG tablet, Take 100 mg by mouth every morning., Disp: , Rfl:     megestroL (MEGACE) 400 mg/10 mL (40 mg/mL) Susp, Take 20 mLs (800 mg total) by mouth once daily., Disp: 600 mL, Rfl: 1    metoprolol succinate (TOPROL-XL) 25 MG 24 hr tablet, Take 50 mg by mouth nightly., Disp: , Rfl:     omeprazole (PRILOSEC) 40 MG capsule, Take 40 mg by mouth., Disp: , Rfl:     ondansetron (ZOFRAN) 4 MG tablet, Take 1 tablet (4 mg total) by mouth every 6 (six) hours as needed for Nausea., Disp: 30 tablet, Rfl: 1    polyethylene glycol (MOVIPREP) 100-7.5-2.691 gram solution, Take a directed per instructions provided by GI Clinic., Disp: 1 kit, Rfl: 0  Allergies: Review of patient's allergies indicates:  No Known Allergies  Social History:   Social History     Tobacco Use    Smoking  status: Former     Types: Cigarettes     Quit date:      Years since quittin.4   Substance Use Topics    Alcohol use: Not Currently     Alcohol/week: 35.0 standard drinks     Types: 25 Cans of beer, 10 Shots of liquor per week    Drug use: Not Currently     Types: Marijuana     Comment: weekends     Family History: History reviewed. No pertinent family history.  Surgical History:   Past Surgical History:   Procedure Laterality Date    COLONOSCOPY, WITH 1 OR MORE BIOPSIES  2023    Procedure: COLONOSCOPY, WITH 1 OR MORE BIOPSIES;  Surgeon: Placido Mckeon MD;  Location: [facility] ENDOSCOPY;  Service: Endoscopy;;    COLONOSCOPY, WITH POLYPECTOMY USING SNARE N/A 2023    Procedure: COLONOSCOPY, WITH POLYPECTOMY USING SNARE;  Surgeon: Placido Mckeon MD;  Location: [facility] ENDOSCOPY;  Service: Endoscopy;  Laterality: N/A;    CORONARY ARTERY BYPASS GRAFT  2021    CORONARY STENT PLACEMENT       Review of Systems:  See HPI.    Objective:    Vitals:  Vitals:    23 1115   BP: 105/70   Pulse: 75   Temp: 97.4 °F (36.3 °C)        Physical Exam:  Gen: NAD  Neuro: awake, alert, answering questions appropriately  CV: RRR, palpable radial pulses  Resp: non-labored breathing, SUREKHA  Ext: moves all 4 spontaneously and purposefully  Skin: warm, well perfused    Pertinent Labs:  CEA 23 264.87  CBC 23 with WBC 12.95, Hg 11.4, HCT 34.1.    Imaging:  MRI Rectal cancer 23  There is large mid to distal rectal mass which is essentially circumferential. The most distal extent of the mass is approximately 5 mm from the anal verge.  The mass measures approximately 85 mm in length.  There is extension through the muscularis propria.  Mass abuts the pelvic floor on the right but no convincing invasion of it.  No convincing involvement of the prostate.  There is a large right perirectal metastatic deposit image 15 series 6 measuring 25 mm.  I count approximately 10 perirectal lymph nodes overall.     Mild bladder  wall thickening may relate to chronic outlet obstruction.  No pelvic free fluid.  No enlarged inguinal lymph nodes.  No suspicious marrow signal abnormality.    Micro/Path/Other:  1. Ileocecal valve colon polyp, Polypectomy:  - Tubular adenoma.  - No evidence of malignancy.    2. Rectal mass, biopsy:  - Adenocarcinoma.        Assessment/Plan:  63 yo male Pmhx CAD s/p 3 stents in 2021, HTN, HLD with current metastatic rectal adenocarcinoma presents for evaluation of mediport placement    - Consented and scheduled pt for mediport placement on Monday, 6/5  - Explained the risks, benefits, alternatives of the procedure and pt voices understanding and consent  - [facility] contacted for cardiology clinic    Robbie Edmonds  [facility] [employer], MS[employer]  06/01/2023    Patient seen and examined with medical student. Appropriate changes made to note. Agree with assessment and plan above.     Caroline Love MD   [facility] General Surgery, PGY-1

## 2023-06-01 NOTE — PROGRESS NOTES
I have reviewed the notes, assessments, and/or procedures performed by the resident, I concur with her/his documentation of Ashkan Mathews.     Tonie Mccray MD

## 2023-06-02 ENCOUNTER — ANESTHESIA EVENT (OUTPATIENT)
Dept: SURGERY | Facility: HOSPITAL | Age: 63
End: 2023-06-02
Payer: MEDICAID

## 2023-06-02 ENCOUNTER — OFFICE VISIT (OUTPATIENT)
Dept: INTERNAL MEDICINE | Facility: CLINIC | Age: 63
End: 2023-06-02
Payer: MEDICAID

## 2023-06-02 ENCOUNTER — TELEPHONE (OUTPATIENT)
Dept: INTERNAL MEDICINE | Facility: CLINIC | Age: 63
End: 2023-06-02
Payer: MEDICAID

## 2023-06-02 VITALS
HEART RATE: 68 BPM | BODY MASS INDEX: 28.11 KG/M2 | RESPIRATION RATE: 18 BRPM | DIASTOLIC BLOOD PRESSURE: 70 MMHG | HEIGHT: 74 IN | TEMPERATURE: 97 F | SYSTOLIC BLOOD PRESSURE: 108 MMHG | WEIGHT: 219 LBS

## 2023-06-02 DIAGNOSIS — C20 ADENOCARCINOMA OF RECTUM METASTATIC TO LIVER: Primary | ICD-10-CM

## 2023-06-02 DIAGNOSIS — C78.7 ADENOCARCINOMA OF RECTUM METASTATIC TO LIVER: Primary | ICD-10-CM

## 2023-06-02 DIAGNOSIS — I10 PRIMARY HYPERTENSION: Primary | ICD-10-CM

## 2023-06-02 DIAGNOSIS — Z01.810 PREOP CARDIOVASCULAR EXAM: ICD-10-CM

## 2023-06-02 PROCEDURE — 99214 OFFICE O/P EST MOD 30 MIN: CPT | Mod: PBBFAC | Performed by: INTERNAL MEDICINE

## 2023-06-02 RX ORDER — ONDANSETRON 2 MG/ML
4 INJECTION INTRAMUSCULAR; INTRAVENOUS ONCE
Status: CANCELLED | OUTPATIENT
Start: 2023-06-02 | End: 2023-06-02

## 2023-06-02 RX ORDER — OXYCODONE AND ACETAMINOPHEN 5; 325 MG/1; MG/1
2 TABLET ORAL ONCE
Status: CANCELLED | OUTPATIENT
Start: 2023-06-02 | End: 2023-06-02

## 2023-06-02 RX ORDER — PROCHLORPERAZINE EDISYLATE 5 MG/ML
5 INJECTION INTRAMUSCULAR; INTRAVENOUS ONCE AS NEEDED
Status: CANCELLED | OUTPATIENT
Start: 2023-06-02 | End: 2034-10-29

## 2023-06-02 RX ORDER — MEPERIDINE HYDROCHLORIDE 25 MG/ML
12.5 INJECTION INTRAMUSCULAR; INTRAVENOUS; SUBCUTANEOUS ONCE
Status: CANCELLED | OUTPATIENT
Start: 2023-06-02 | End: 2023-06-02

## 2023-06-02 RX ORDER — DIPHENHYDRAMINE HYDROCHLORIDE 50 MG/ML
25 INJECTION INTRAMUSCULAR; INTRAVENOUS ONCE AS NEEDED
Status: CANCELLED | OUTPATIENT
Start: 2023-06-02 | End: 2034-10-29

## 2023-06-02 RX ORDER — HYDROMORPHONE HYDROCHLORIDE 1 MG/ML
0.2 INJECTION, SOLUTION INTRAMUSCULAR; INTRAVENOUS; SUBCUTANEOUS EVERY 5 MIN PRN
Status: CANCELLED | OUTPATIENT
Start: 2023-06-02

## 2023-06-02 RX ORDER — HYDROMORPHONE HYDROCHLORIDE 1 MG/ML
0.5 INJECTION, SOLUTION INTRAMUSCULAR; INTRAVENOUS; SUBCUTANEOUS EVERY 5 MIN PRN
Status: CANCELLED | OUTPATIENT
Start: 2023-06-02

## 2023-06-02 NOTE — PROGRESS NOTES
"[facility] INTERNAL MEDICINE  OUTPATIENT OFFICE VISIT NOTE    SUBJECTIVE:      HPI: Ashkan Mathews is a 62 y.o. male w/ PMH of Rectal cancer being seeing in preop clinic for Mediport placement.He has hx ofCABG2 yrs ago with uneventful recovery.Pt not on any antocoagulation.    ROS:  (+)  (-) Chest pain, palpitations, SOB, dizziness, syncope, edema, PND, orthopnea, claudication, cough, fever, chills, abdominal pain, vomiting, diarrhea, dysuria, bleeding    OBJECTIVE:     Vital signs:   /70 (BP Location: Left arm, Patient Position: Sitting, BP Method: Medium (Automatic))   Pulse 68   Temp 97.1 °F (36.2 °C) (Oral)   Resp 18   Ht 6' 2.4" (1.89 m)   Wt 99.3 kg (219 lb)   BMI 27.82 kg/m²      Physical Examination:  General:  Well-nourished, well-developed, no acute distress  HEENT: Normocephalic, atraumatic. EOMI.  MMM  Neck: Supple. No obvious JVD.  Normal range of motion.  Respiratory: CTAB.  No obvious crackles wheeze or rhonchi.  Cardiovascular:  RRR.  No obvious M/G/R. Warm extremities.  Peripheral pulses intact.  No edema.  Gastrointestinal:  Soft, nontender, nondistended.  Normal bowel sounds.  Neurologic: ANOx3.  Answering questions/following commands appropriately.  No FND      Current Outpatient Medications:     amLODIPine (NORVASC) 5 MG tablet, Take 5 mg by mouth once daily., Disp: , Rfl:     hydroCHLOROthiazide (HYDRODIURIL) 25 MG tablet, Take 25 mg by mouth., Disp: , Rfl:     HYDROcodone-acetaminophen (NORCO)  mg per tablet, Take 1 tablet by mouth every 4 (four) hours as needed for Pain., Disp: 60 tablet, Rfl: 0    losartan (COZAAR) 100 MG tablet, Take 100 mg by mouth every morning., Disp: , Rfl:     megestroL (MEGACE) 400 mg/10 mL (40 mg/mL) Susp, Take 20 mLs (800 mg total) by mouth once daily., Disp: 600 mL, Rfl: 1    metoprolol succinate (TOPROL-XL) 25 MG 24 hr tablet, Take 50 mg by mouth nightly., Disp: , Rfl:     omeprazole (PRILOSEC) 40 MG capsule, Take 40 mg by mouth., Disp: , Rfl:     " ondansetron (ZOFRAN) 4 MG tablet, Take 1 tablet (4 mg total) by mouth every 6 (six) hours as needed for Nausea., Disp: 30 tablet, Rfl: 1    polyethylene glycol (MOVIPREP) 100-7.5-2.691 gram solution, Take a directed per instructions provided by GI Clinic., Disp: 1 kit, Rfl: 0     ASSESSMENT & PLAN:     1.HTN_ well controlled.  2.CAD.S/P Cabg-No angiina, CHF or arrythmias.  3.GERD - doing well on PPI  4.Stage 4 Rectal cancer- awaiting Chemo  5.RCRI-1.Medically stable for surgery.pt has no bleeidng tendency and not on antocougulation  Andrey Melton MD

## 2023-06-04 NOTE — ANESTHESIA PREPROCEDURE EVALUATION
"                                                                                                             06/04/2023  Ashkan Mathews is a 62 y.o., male for Mediport placement  -- metastatic (liver, pelvic LAD)   Adenocarcinoma  of rectum    History of Rectal Mass , CAD s/p 3v CABG 2021 ,PCI,  HLD, HTN, chewing tobacco,  Heavy smoker,  MO, GERD; greater than 4 mets daily without difficulty , poor dentition is noted        LD BB @ 6/5/23 @ 0330      Pre-op Assessment    I have reviewed the NPO Status.      Review of Systems  Anesthesia Hx:  No problems with previous Anesthesia    Social:  Former Smoker    Hematology/Oncology:        Current/Recent Cancer. Other (see Oncology comments)   Cardiovascular:   Hypertension Past MI CAD asymptomatic CABG/stent  hyperlipidemia    Pulmonary:  Pulmonary Normal    Renal/:  Renal/ Normal     Hepatic/GI:   GERD, well controlled    Neurological:  Neurology Normal    Endocrine:  Endocrine Normal      Vitals:    06/02/23 1205 06/05/23 0509 06/05/23 0515 06/05/23 0612   BP:   (!) 88/55 90/62   Pulse:   85 82   Resp:    20   Temp:   36.4 °C (97.5 °F) 36.3 °C (97.3 °F)   TempSrc:   Oral Temporal   SpO2:   98% 100%   Weight: 96.6 kg (213 lb) 96.2 kg (212 lb 1.3 oz)     Height: 6' 2.02" (1.88 m)            Physical Exam  General: Alert, Cooperative and Well nourished    Airway:  Mallampati: II   Mouth Opening: Normal  TM Distance: Normal  Tongue: Normal  Neck ROM: Normal ROM    Dental:  Intact    Chest/Lungs:  Clear to auscultation, Normal Respiratory Rate    Heart:  Rate: Normal  Rhythm: Regular Rhythm  Sounds: Normal      Lab Results   Component Value Date    WBC 12.95 (H) 05/26/2023    HGB 11.4 (L) 05/26/2023    HCT 34.1 (L) 05/26/2023    MCV 82.8 05/26/2023     (H) 05/26/2023       CMP  Sodium Level   Date Value Ref Range Status   05/26/2023 134 (L) 136 - 145 mmol/L Final     Potassium Level   Date Value Ref Range Status   05/26/2023 4.0 3.5 - 5.1 mmol/L Final "     Carbon Dioxide   Date Value Ref Range Status   05/26/2023 23 23 - 31 mmol/L Final     Blood Urea Nitrogen   Date Value Ref Range Status   05/26/2023 16.0 8.4 - 25.7 mg/dL Final     Creatinine   Date Value Ref Range Status   05/26/2023 1.06 0.73 - 1.18 mg/dL Final     Calcium Level Total   Date Value Ref Range Status   05/26/2023 9.8 8.8 - 10.0 mg/dL Final     Albumin Level   Date Value Ref Range Status   05/26/2023 3.0 (L) 3.4 - 4.8 g/dL Final     Bilirubin Total   Date Value Ref Range Status   05/26/2023 1.0 <=1.5 mg/dL Final     Alkaline Phosphatase   Date Value Ref Range Status   05/26/2023 254 (H) 40 - 150 unit/L Final     Aspartate Aminotransferase   Date Value Ref Range Status   05/26/2023 27 5 - 34 unit/L Final     Alanine Aminotransferase   Date Value Ref Range Status   05/26/2023 16 0 - 55 unit/L Final     eGFR   Date Value Ref Range Status   05/26/2023 >60 mls/min/1.73/m2 Final     PER CARDS 4.27.23 -- no noted EKG, ECHO in chart     Pre-Op Cardiac Risk Assessment: The patient is patient has intermediate cardiac risk for moderate risk procedure.  at moderate cardiac risk for a intermediate risk procedure.     B-blocker:  continue metoprolol in the korey-operative period                      Anesthesia Plan  Type of Anesthesia, risks & benefits discussed:    Anesthesia Type: MAC  Intra-op Monitoring Plan: Standard ASA Monitors  Post Op Pain Control Plan: IV/PO Opioids PRN  Induction:  IV  Informed Consent: Informed consent signed with the Patient and all parties understand the risks and agree with anesthesia plan.  All questions answered.   ASA Score: 3  Day of Surgery Review of History & Physical: H&P Update referred to the surgeon/provider.    Ready For Surgery From Anesthesia Perspective.     .

## 2023-06-05 ENCOUNTER — HOSPITAL ENCOUNTER (OUTPATIENT)
Facility: HOSPITAL | Age: 63
Discharge: HOME | End: 2023-06-05
Attending: SURGERY | Admitting: SURGERY
Payer: MEDICAID

## 2023-06-05 ENCOUNTER — ANESTHESIA (OUTPATIENT)
Dept: SURGERY | Facility: HOSPITAL | Age: 63
End: 2023-06-05
Payer: MEDICAID

## 2023-06-05 DIAGNOSIS — C78.7 ADENOCARCINOMA OF RECTUM METASTATIC TO LIVER: Primary | ICD-10-CM

## 2023-06-05 DIAGNOSIS — C20 ADENOCARCINOMA OF RECTUM METASTATIC TO LIVER: Primary | ICD-10-CM

## 2023-06-05 DIAGNOSIS — C20 RECTAL CANCER: ICD-10-CM

## 2023-06-05 PROCEDURE — D9220A PRA ANESTHESIA: ICD-10-PCS | Mod: ,,, | Performed by: NURSE ANESTHETIST, CERTIFIED REGISTERED

## 2023-06-05 PROCEDURE — 63600175 PHARM REV CODE 636 W HCPCS

## 2023-06-05 PROCEDURE — 63600175 PHARM REV CODE 636 W HCPCS: Performed by: SPECIALIST

## 2023-06-05 PROCEDURE — 63600175 PHARM REV CODE 636 W HCPCS: Performed by: NURSE ANESTHETIST, CERTIFIED REGISTERED

## 2023-06-05 PROCEDURE — 25000003 PHARM REV CODE 250: Performed by: NURSE ANESTHETIST, CERTIFIED REGISTERED

## 2023-06-05 PROCEDURE — 63600175 PHARM REV CODE 636 W HCPCS: Performed by: SURGERY

## 2023-06-05 PROCEDURE — 00532 ANES ACCESS CTR VENOUS CRCJ: CPT | Performed by: SURGERY

## 2023-06-05 PROCEDURE — C1788 PORT, INDWELLING, IMP: HCPCS | Performed by: SURGERY

## 2023-06-05 PROCEDURE — 71000015 HC POSTOP RECOV 1ST HR: Performed by: SURGERY

## 2023-06-05 PROCEDURE — 36000707: Performed by: SURGERY

## 2023-06-05 PROCEDURE — 71000016 HC POSTOP RECOV ADDL HR: Performed by: SURGERY

## 2023-06-05 PROCEDURE — D9220A PRA ANESTHESIA: Mod: ,,, | Performed by: NURSE ANESTHETIST, CERTIFIED REGISTERED

## 2023-06-05 PROCEDURE — 25000003 PHARM REV CODE 250: Performed by: SURGERY

## 2023-06-05 PROCEDURE — 36000706: Performed by: SURGERY

## 2023-06-05 PROCEDURE — 37000009 HC ANESTHESIA EA ADD 15 MINS: Performed by: SURGERY

## 2023-06-05 PROCEDURE — 37000008 HC ANESTHESIA 1ST 15 MINUTES: Performed by: SURGERY

## 2023-06-05 DEVICE — PORT POWER CLEAR VIEW: Type: IMPLANTABLE DEVICE | Site: CHEST  WALL | Status: FUNCTIONAL

## 2023-06-05 RX ORDER — EPHEDRINE SULFATE 50 MG/ML
INJECTION, SOLUTION INTRAVENOUS
Status: DISCONTINUED | OUTPATIENT
Start: 2023-06-05 | End: 2023-06-05

## 2023-06-05 RX ORDER — CEFAZOLIN SODIUM 1 G/3ML
2 INJECTION, POWDER, FOR SOLUTION INTRAMUSCULAR; INTRAVENOUS
Status: COMPLETED | OUTPATIENT
Start: 2023-06-05 | End: 2023-06-05

## 2023-06-05 RX ORDER — PROPOFOL 10 MG/ML
VIAL (ML) INTRAVENOUS CONTINUOUS PRN
Status: DISCONTINUED | OUTPATIENT
Start: 2023-06-05 | End: 2023-06-05

## 2023-06-05 RX ORDER — MIDAZOLAM HYDROCHLORIDE 1 MG/ML
2 INJECTION INTRAMUSCULAR; INTRAVENOUS ONCE
Status: COMPLETED | OUTPATIENT
Start: 2023-06-05 | End: 2023-06-05

## 2023-06-05 RX ORDER — GLYCOPYRROLATE 0.2 MG/ML
INJECTION INTRAMUSCULAR; INTRAVENOUS
Status: DISCONTINUED | OUTPATIENT
Start: 2023-06-05 | End: 2023-06-05

## 2023-06-05 RX ORDER — KETAMINE HCL IN 0.9 % NACL 50 MG/5 ML
SYRINGE (ML) INTRAVENOUS
Status: DISCONTINUED | OUTPATIENT
Start: 2023-06-05 | End: 2023-06-05

## 2023-06-05 RX ORDER — SODIUM CHLORIDE 9 MG/ML
INJECTION, SOLUTION INTRAVENOUS CONTINUOUS
Status: DISCONTINUED | OUTPATIENT
Start: 2023-06-05 | End: 2023-06-05 | Stop reason: HOSPADM

## 2023-06-05 RX ORDER — MEPERIDINE HYDROCHLORIDE 25 MG/ML
12.5 INJECTION INTRAMUSCULAR; INTRAVENOUS; SUBCUTANEOUS ONCE
Status: DISCONTINUED | OUTPATIENT
Start: 2023-06-05 | End: 2023-06-05 | Stop reason: HOSPADM

## 2023-06-05 RX ORDER — DIAZEPAM 5 MG/1
10 TABLET ORAL ONCE
Status: DISCONTINUED | OUTPATIENT
Start: 2023-06-05 | End: 2023-06-05 | Stop reason: HOSPADM

## 2023-06-05 RX ORDER — HEPARIN SOD,PORCINE/0.9 % NACL 1000/500ML
INTRAVENOUS SOLUTION INTRAVENOUS
Status: DISCONTINUED | OUTPATIENT
Start: 2023-06-05 | End: 2023-06-05 | Stop reason: HOSPADM

## 2023-06-05 RX ORDER — SODIUM CHLORIDE, SODIUM LACTATE, POTASSIUM CHLORIDE, CALCIUM CHLORIDE 600; 310; 30; 20 MG/100ML; MG/100ML; MG/100ML; MG/100ML
INJECTION, SOLUTION INTRAVENOUS CONTINUOUS
Status: DISCONTINUED | OUTPATIENT
Start: 2023-06-05 | End: 2023-07-03

## 2023-06-05 RX ORDER — LIDOCAINE HYDROCHLORIDE 20 MG/ML
INJECTION INTRAVENOUS
Status: DISCONTINUED | OUTPATIENT
Start: 2023-06-05 | End: 2023-06-05

## 2023-06-05 RX ORDER — HEPARIN 100 UNIT/ML
SYRINGE INTRAVENOUS
Status: DISCONTINUED
Start: 2023-06-05 | End: 2023-06-05 | Stop reason: HOSPADM

## 2023-06-05 RX ORDER — PHENYLEPHRINE HYDROCHLORIDE 10 MG/ML
INJECTION INTRAVENOUS
Status: DISCONTINUED | OUTPATIENT
Start: 2023-06-05 | End: 2023-06-05

## 2023-06-05 RX ORDER — HYDROMORPHONE HYDROCHLORIDE 1 MG/ML
0.5 INJECTION, SOLUTION INTRAMUSCULAR; INTRAVENOUS; SUBCUTANEOUS EVERY 5 MIN PRN
Status: DISCONTINUED | OUTPATIENT
Start: 2023-06-05 | End: 2023-06-05 | Stop reason: HOSPADM

## 2023-06-05 RX ORDER — SODIUM CHLORIDE, SODIUM LACTATE, POTASSIUM CHLORIDE, CALCIUM CHLORIDE 600; 310; 30; 20 MG/100ML; MG/100ML; MG/100ML; MG/100ML
INJECTION, SOLUTION INTRAVENOUS CONTINUOUS
Status: DISCONTINUED | OUTPATIENT
Start: 2023-06-05 | End: 2023-06-05 | Stop reason: HOSPADM

## 2023-06-05 RX ORDER — BUPIVACAINE HYDROCHLORIDE 2.5 MG/ML
INJECTION, SOLUTION EPIDURAL; INFILTRATION; INTRACAUDAL
Status: DISCONTINUED | OUTPATIENT
Start: 2023-06-05 | End: 2023-06-05 | Stop reason: HOSPADM

## 2023-06-05 RX ORDER — PROCHLORPERAZINE EDISYLATE 5 MG/ML
5 INJECTION INTRAMUSCULAR; INTRAVENOUS ONCE AS NEEDED
Status: DISCONTINUED | OUTPATIENT
Start: 2023-06-05 | End: 2023-06-05 | Stop reason: HOSPADM

## 2023-06-05 RX ORDER — MIDAZOLAM HYDROCHLORIDE 1 MG/ML
INJECTION INTRAMUSCULAR; INTRAVENOUS
Status: COMPLETED
Start: 2023-06-05 | End: 2023-06-05

## 2023-06-05 RX ORDER — BUPIVACAINE HYDROCHLORIDE 2.5 MG/ML
INJECTION, SOLUTION EPIDURAL; INFILTRATION; INTRACAUDAL
Status: DISCONTINUED
Start: 2023-06-05 | End: 2023-06-05 | Stop reason: HOSPADM

## 2023-06-05 RX ORDER — DIPHENHYDRAMINE HYDROCHLORIDE 50 MG/ML
25 INJECTION INTRAMUSCULAR; INTRAVENOUS ONCE AS NEEDED
Status: DISCONTINUED | OUTPATIENT
Start: 2023-06-05 | End: 2023-06-05 | Stop reason: HOSPADM

## 2023-06-05 RX ORDER — HYDROMORPHONE HYDROCHLORIDE 1 MG/ML
0.2 INJECTION, SOLUTION INTRAMUSCULAR; INTRAVENOUS; SUBCUTANEOUS EVERY 5 MIN PRN
Status: DISCONTINUED | OUTPATIENT
Start: 2023-06-05 | End: 2023-06-05 | Stop reason: HOSPADM

## 2023-06-05 RX ORDER — HEPARIN 100 UNIT/ML
SYRINGE INTRAVENOUS
Status: DISCONTINUED | OUTPATIENT
Start: 2023-06-05 | End: 2023-06-05 | Stop reason: HOSPADM

## 2023-06-05 RX ORDER — ONDANSETRON 2 MG/ML
4 INJECTION INTRAMUSCULAR; INTRAVENOUS ONCE
Status: DISCONTINUED | OUTPATIENT
Start: 2023-06-05 | End: 2023-06-05 | Stop reason: HOSPADM

## 2023-06-05 RX ORDER — HEPARIN SODIUM 200 [USP'U]/100ML
INJECTION, SOLUTION INTRAVENOUS
Status: DISCONTINUED
Start: 2023-06-05 | End: 2023-06-05 | Stop reason: HOSPADM

## 2023-06-05 RX ORDER — OXYCODONE AND ACETAMINOPHEN 5; 325 MG/1; MG/1
2 TABLET ORAL ONCE
Status: DISCONTINUED | OUTPATIENT
Start: 2023-06-05 | End: 2023-06-05 | Stop reason: HOSPADM

## 2023-06-05 RX ORDER — HEPARIN SODIUM 5000 [USP'U]/ML
5000 INJECTION, SOLUTION INTRAVENOUS; SUBCUTANEOUS EVERY 8 HOURS
Status: DISCONTINUED | OUTPATIENT
Start: 2023-06-05 | End: 2023-06-05 | Stop reason: HOSPADM

## 2023-06-05 RX ADMIN — Medication 5 MG: at 07:06

## 2023-06-05 RX ADMIN — MIDAZOLAM HYDROCHLORIDE 2 MG: 1 INJECTION, SOLUTION INTRAMUSCULAR; INTRAVENOUS at 06:06

## 2023-06-05 RX ADMIN — PHENYLEPHRINE HYDROCHLORIDE 200 MCG: 10 INJECTION INTRAVENOUS at 07:06

## 2023-06-05 RX ADMIN — LIDOCAINE HYDROCHLORIDE 100 MG: 20 INJECTION INTRAVENOUS at 07:06

## 2023-06-05 RX ADMIN — MIDAZOLAM HYDROCHLORIDE 2 MG: 1 INJECTION INTRAMUSCULAR; INTRAVENOUS at 06:06

## 2023-06-05 RX ADMIN — CEFAZOLIN 2 G: 330 INJECTION, POWDER, FOR SOLUTION INTRAMUSCULAR; INTRAVENOUS at 07:06

## 2023-06-05 RX ADMIN — GLYCOPYRROLATE 0.2 MG: 1 INJECTION INTRAMUSCULAR; INTRAVENOUS at 07:06

## 2023-06-05 RX ADMIN — EPHEDRINE SULFATE 15 MG: 50 INJECTION INTRAVENOUS at 07:06

## 2023-06-05 RX ADMIN — PHENYLEPHRINE HYDROCHLORIDE 100 MCG: 10 INJECTION INTRAVENOUS at 07:06

## 2023-06-05 RX ADMIN — HEPARIN SODIUM 5000 UNITS: 5000 INJECTION, SOLUTION INTRAVENOUS; SUBCUTANEOUS at 05:06

## 2023-06-05 RX ADMIN — EPHEDRINE SULFATE 5 MG: 50 INJECTION INTRAVENOUS at 07:06

## 2023-06-05 RX ADMIN — SODIUM CHLORIDE, POTASSIUM CHLORIDE, SODIUM LACTATE AND CALCIUM CHLORIDE: 600; 310; 30; 20 INJECTION, SOLUTION INTRAVENOUS at 06:06

## 2023-06-05 RX ADMIN — PROPOFOL 120 MCG/KG/MIN: 10 INJECTION, EMULSION INTRAVENOUS at 07:06

## 2023-06-05 NOTE — INTERVAL H&P NOTE
The patient has been examined and the H&P has been reviewed:    I concur with the findings and no changes have occurred since H&P was written.    The patient's last PO intake was prior to midnight. He is consented. We will proceed to OR for mediport placement today.     Caroline Love MD   [employer] General Surgery, PGY-1

## 2023-06-05 NOTE — DISCHARGE INSTRUCTIONS
· Resume home medications unless otherwise instructed by your doctor.    · No heavy lifting or straining over 10 pounds for 3-4 days then resume activities as tolerated.    · You may take a shower starting tomorrow evening. Wash GENTLY with soap and water (do not scrub) over incision, rinse with water, and pat dry.    · Do not soak your wound in water for two weeks. Do not take baths, swim, or use a hot tub until your doctor says it is okay.    · You may take Ibuprofen or Tylenol every 4-6 hours as needed for incisional pain.  Do not take Norco and Tylenol at the same time because Norco contains Tylenol    · You may use an ice pack as needed for 20 minutes at a time over your incision site to minimize swelling and help relieve pain.    · Do not drink alcohol or drive today, or as long as you are on pain medication.    · Notify MD of any moderate to severe pain unrelieved by pain medicine, if your incision opens and/or bleeds, or for any signs of infection including fever above 100.4, excessive redness or swelling, yellow/green foul- smelling drainage, nausea or vomiting. Clinics number is [phone]. If it is after business hours or emergency call [phone] and state Im having post op complications and need to speak to the surgeon on call.    · Thanks for choosing [facility]! Have a smooth recovery!

## 2023-06-05 NOTE — TRANSFER OF CARE
Anesthesia Transfer of Care Note    Patient: Ashkan Mathews    Procedure(s) Performed: Procedure(s) (LRB):  Placement, Mediport (N/A)    Patient location: OPS    Anesthesia Type: MAC    Transport from OR: Transported from OR on room air with adequate spontaneous ventilation    Post pain: adequate analgesia    Post assessment: no apparent anesthetic complications    Post vital signs: stable    Level of consciousness: awake    Nausea/Vomiting: no nausea/vomiting    Complications: none    Transfer of care protocol was followed      95%spO2 room air

## 2023-06-05 NOTE — ANESTHESIA POSTPROCEDURE EVALUATION
Anesthesia Post Evaluation    Patient: Ashkan Mathews    Procedure(s) Performed: Procedure(s) (LRB):  Placement, Mediport (N/A)    Final Anesthesia Type: MAC      Patient location during evaluation: OPS  Patient participation: Yes- Able to Participate  Level of consciousness: awake and alert  Post-procedure vital signs: reviewed and stable  Pain management: adequate  Airway patency: patent    PONV status at discharge: No PONV  Anesthetic complications: no      Cardiovascular status: hemodynamically stable  Respiratory status: unassisted, room air and spontaneous ventilation  Hydration status: euvolemic  Follow-up not needed.

## 2023-06-05 NOTE — DISCHARGE SUMMARY
[facility] - Periop Services  Discharge Note  Short Stay    Procedure(s) (LRB):  Placement, Mediport (N/A)      OUTCOME: Condition has improved and patient is now ready for discharge.    DISPOSITION: Home or Self Care    FINAL DIAGNOSIS: Rectal cancer    FOLLOWUP: In clinic    DISCHARGE INSTRUCTIONS:    Discharge Procedure Orders   Diet Adult Regular     Notify your health care provider if you experience any of the following:  temperature >100.4     Notify your health care provider if you experience any of the following:  severe uncontrolled pain     Notify your health care provider if you experience any of the following:  redness, tenderness, or signs of infection (pain, swelling, redness, odor or green/yellow discharge around incision site)     Leave dressing on - Keep it clean, dry, and intact until clinic visit   Scheduling Instructions: Your incision sites have dermabond on them that will begin to peel off on their own in 5-7 days. You can peel them off completely at that time.     You can wash over incisions with soap and water and pat dry. Do not soak in tubs, pools, etc. For 2 weeks.     Activity as tolerated        TIME SPENT ON DISCHARGE: 10 minutes

## 2023-06-05 NOTE — OP NOTE
[facility] - Periop Services  Brief Operative Note    SUMMARY     Surgery Date: 2023     Surgeon(s) and Role:     * Joe Hendrix Jr., MD - Primary     * Santi Mann MD - Resident - Assisting     * Sherry Rivera MD - Resident - Assisting      * Caroline Love MD - Resident - Assisting        Pre-op Diagnosis:  * No pre-op diagnosis entered *    Post-op Diagnosis:  Post-Op Diagnosis Codes:     * Rectal cancer [C20]    Procedure(s) (LRB):  Placement, Mediport (N/A)    Anesthesia: Monitor Anesthesia Care    Operative Findings: Right subclavian Mediport successfully placed    Operative report:   The patient was identified in pre-op by name, , and MRN. After verifying informed consent had been obtained, the patient was taken to the operating room and placed on the table in supine position.     Anesthesia was induced, and bilateral chest and neck were prepped and draped in sterile fashion. Skin overlying puncture site was infiltrated with lidocaine. A needle was brought onto the field and used to access the right subclavian. This was confirmed with return of non-pulsatile, dark, venous-appearing blood. A guidewire was inserted into the needle into the central venous system and fluoroscopy was used to confirm correct location. We then selected port placement site on right chest and infiltrated with lidocaine. We also infiltrated tract between port site and subclavian insertion site. A 3 cm incision was made on the upper chest and a 2x3 cm port pocket was was created in the subcutaneous space overlying the pectoralis fascia inferior to the incision. The catheter was then tunneled from the port pocket to the initial puncture site. The catheter was cut to size and attached to port. The dilator with sheath was then passed over the wire at subclavian puncture site. The catheter was then inserted into inserted into the sheath and into the central venous system. The sheath was torn away from the  catheter. The port was then placed into the port pocket.A Menjivar needle was brought up onto the field and used to flush and aspirate the port with heparin. It did so with no difficulty, and was then locked with heparinized saline.    The incision was reapproximated in a deep dermal fashion using 3-0 vicryl, and the skin was closed in a running subcuticular manner using 4-0 Monocryl.     The patient tolerated procedure well. The needle and sponge count were accurate at the conclusion of the case. Pt was transferred to recovery room in satisfactory condition.    Estimated Blood Loss: * No values recorded between 6/5/2023  7:23 AM and 6/5/2023  8:13 AM *    Estimated Blood Loss has not been documented. EBL = 10 cc.         Specimens:   Specimen (24h ago, onward)      None            [id number]    Caroline Love MD   [facility] Surgery, PGY-1

## 2023-06-06 ENCOUNTER — HOSPITAL ENCOUNTER (OUTPATIENT)
Dept: RADIOLOGY | Facility: HOSPITAL | Age: 63
Discharge: HOME | End: 2023-06-06
Attending: INTERNAL MEDICINE
Payer: MEDICAID

## 2023-06-06 DIAGNOSIS — K62.89 RECTAL MASS: ICD-10-CM

## 2023-06-06 PROCEDURE — 25500020 PHARM REV CODE 255

## 2023-06-06 PROCEDURE — 71260 CT THORAX DX C+: CPT | Mod: TC

## 2023-06-06 PROCEDURE — 74177 CT ABD & PELVIS W/CONTRAST: CPT | Mod: TC

## 2023-06-06 RX ADMIN — IOHEXOL 100 ML: 350 INJECTION, SOLUTION INTRAVENOUS at 08:06

## 2023-06-08 ENCOUNTER — HOSPITAL ENCOUNTER (OUTPATIENT)
Dept: INTERVENTIONAL RADIOLOGY/VASCULAR | Facility: HOSPITAL | Age: 63
Discharge: HOME | End: 2023-06-08
Attending: INTERNAL MEDICINE
Payer: MEDICAID

## 2023-06-08 VITALS
SYSTOLIC BLOOD PRESSURE: 120 MMHG | RESPIRATION RATE: 20 BRPM | BODY MASS INDEX: 26.94 KG/M2 | OXYGEN SATURATION: 94 % | WEIGHT: 212.06 LBS | HEART RATE: 89 BPM | DIASTOLIC BLOOD PRESSURE: 76 MMHG

## 2023-06-08 DIAGNOSIS — R16.0 LIVER MASS: ICD-10-CM

## 2023-06-08 DIAGNOSIS — K62.89 RECTAL MASS: ICD-10-CM

## 2023-06-08 PROCEDURE — 88333 PATH CONSLTJ SURG CYTO XM 1: CPT | Mod: TC | Performed by: INTERNAL MEDICINE

## 2023-06-08 PROCEDURE — 25000003 PHARM REV CODE 250: Performed by: RADIOLOGY

## 2023-06-08 PROCEDURE — 47000 NEEDLE BIOPSY OF LIVER PERQ: CPT

## 2023-06-08 PROCEDURE — 63600175 PHARM REV CODE 636 W HCPCS

## 2023-06-08 RX ORDER — SODIUM CHLORIDE 0.9 % (FLUSH) 0.9 %
10 SYRINGE (ML) INJECTION
Status: DISCONTINUED | OUTPATIENT
Start: 2023-06-08 | End: 2023-06-09 | Stop reason: HOSPADM

## 2023-06-08 RX ORDER — HYDROCODONE BITARTRATE AND ACETAMINOPHEN 10; 325 MG/1; MG/1
1 TABLET ORAL ONCE
Status: DISCONTINUED | OUTPATIENT
Start: 2023-06-08 | End: 2023-06-09 | Stop reason: HOSPADM

## 2023-06-08 RX ORDER — LIDOCAINE HYDROCHLORIDE 10 MG/ML
INJECTION INFILTRATION; PERINEURAL
Status: COMPLETED | OUTPATIENT
Start: 2023-06-08 | End: 2023-06-08

## 2023-06-08 RX ORDER — FENTANYL CITRATE 50 UG/ML
INJECTION, SOLUTION INTRAMUSCULAR; INTRAVENOUS
Status: COMPLETED | OUTPATIENT
Start: 2023-06-08 | End: 2023-06-08

## 2023-06-08 RX ORDER — HYDROCODONE BITARTRATE AND ACETAMINOPHEN 10; 325 MG/1; MG/1
1 TABLET ORAL EVERY 6 HOURS PRN
Status: DISCONTINUED | OUTPATIENT
Start: 2023-06-08 | End: 2023-06-08

## 2023-06-08 RX ADMIN — LIDOCAINE HYDROCHLORIDE 4 ML: 10 INJECTION, SOLUTION INFILTRATION; PERINEURAL at 09:06

## 2023-06-08 RX ADMIN — FENTANYL CITRATE 25 MCG: 50 INJECTION, SOLUTION INTRAMUSCULAR; INTRAVENOUS at 08:06

## 2023-06-08 NOTE — DISCHARGE INSTRUCTIONS
· Keep all follow-up appointments. Your referring doctor will give you your biopsy results when they are ready.    · Keep bandage clean and dry until tomorrow evening. Then you may remove it and wash site gently with soap and water.    · No driving or consuming alcohol for the next 24 hours.    · Call 911 or report to your nearest emergency room with any abdominal pain, changes in the color of your abdomen, or bleeding from the biopsy site.    · Notify MD of any moderate to severe pain unrelieved by pain medicine or for any signs of infection including fever above 100.4, excessive redness or swelling, yellow/green foul- smelling drainage, nausea or vomiting. Call your referring doctor, or call 911/ report to emergency room.    ·  Thanks for choosing [facility]! Have a smooth recovery!

## 2023-06-08 NOTE — H&P
Radiology History & Physical      SUBJECTIVE:     Chief Complaint: Liver Lesions    History of Present Illness:  Ashkan Mathews is a 62 y.o. male who presents for Biuopsy  Past Medical History:   Diagnosis Date    CAD (coronary artery disease)     Cancer     HLD (hyperlipidemia)     HTN (hypertension)     MI (myocardial infarction)     Obesity, unspecified      Past Surgical History:   Procedure Laterality Date    COLONOSCOPY, WITH 1 OR MORE BIOPSIES  5/4/2023    Procedure: COLONOSCOPY, WITH 1 OR MORE BIOPSIES;  Surgeon: Placido Mckeon MD;  Location: [facility] ENDOSCOPY;  Service: Endoscopy;;    COLONOSCOPY, WITH POLYPECTOMY USING SNARE N/A 5/4/2023    Procedure: COLONOSCOPY, WITH POLYPECTOMY USING SNARE;  Surgeon: Placido Mckeon MD;  Location: [facility] ENDOSCOPY;  Service: Endoscopy;  Laterality: N/A;    CORONARY ARTERY BYPASS GRAFT  04/2021    CORONARY STENT PLACEMENT      PLACEMENT, MEDIPORT N/A 6/5/2023    Procedure: Placement, Mediport;  Surgeon: Joe Hendrix Jr., MD;  Location: [facility] OR;  Service: General;  Laterality: N/A;       Home Meds:   Prior to Admission medications    Medication Sig Start Date End Date Taking? Authorizing Provider   amLODIPine (NORVASC) 5 MG tablet Take 5 mg by mouth once daily. 5/26/22  Yes Historical Provider   metoprolol succinate (TOPROL-XL) 25 MG 24 hr tablet Take 50 mg by mouth nightly. 6/7/22  Yes Historical Provider   hydroCHLOROthiazide (HYDRODIURIL) 25 MG tablet Take 25 mg by mouth. 4/17/23   Historical Provider   HYDROcodone-acetaminophen (NORCO)  mg per tablet Take 1 tablet by mouth every 4 (four) hours as needed for Pain. 5/26/23   Jamaal Guevara MD   losartan (COZAAR) 100 MG tablet Take 100 mg by mouth every morning. 3/27/23   Historical Provider   megestroL (MEGACE) 400 mg/10 mL (40 mg/mL) Susp Take 20 mLs (800 mg total) by mouth once daily. 5/26/23 5/25/24  Jamaal Guevara MD   omeprazole (PRILOSEC) 40 MG capsule Take 40 mg by mouth. 9/18/22    Historical Provider   ondansetron (ZOFRAN) 4 MG tablet Take 1 tablet (4 mg total) by mouth every 6 (six) hours as needed for Nausea. 5/26/23 5/25/24  Jamaal Guevara MD   polyethylene glycol (MOVIPREP) 100-7.5-2.691 gram solution Take a directed per instructions provided by GI Clinic. 5/2/23   RENE Segovia     Anticoagulants/Antiplatelets: no anticoagulation    Allergies: Review of patient's allergies indicates:  No Known Allergies  Sedation History:  no adverse reactions    Review of Systems:   Hematological: no known coagulopathies  Respiratory: no shortness of breath  Cardiovascular: no chest pain  Gastrointestinal: no abdominal pain  Genito-Urinary: no dysuria  Musculoskeletal: negative  Neurological: no TIA or stroke symptoms         OBJECTIVE:     Vital Signs (Most Recent)  Pulse: 89 (06/08/23 0908)  Resp: 17 (06/08/23 0908)  BP: 96/64 (06/08/23 0926)  SpO2: (!) 94 % (06/08/23 0908)    Physical Exam:  ASA: 2    General: no acute distress  Mental Status: alert and oriented to person, place and time  HEENT: normocephalic, atraumatic  Chest: unlabored breathing  Heart: regular heart rate  Abdomen: nondistended  Extremity: moves all extremities    Laboratory  Lab Results   Component Value Date    INR 1.12 06/08/2023       Lab Results   Component Value Date    WBC 12.95 (H) 05/26/2023    HGB 11.4 (L) 05/26/2023    HCT 34.1 (L) 05/26/2023    MCV 82.8 05/26/2023     (H) 05/26/2023      Lab Results   Component Value Date     (L) 05/26/2023    K 4.0 05/26/2023    CO2 23 05/26/2023    BUN 16.0 05/26/2023    CREATININE 1.06 05/26/2023    CALCIUM 9.8 05/26/2023    MG 2.20 05/26/2023    ALT 16 05/26/2023    AST 27 05/26/2023    ALBUMIN 3.0 (L) 05/26/2023    BILITOT 1.0 05/26/2023    BILIDIR 0.4 04/06/2021       ASSESSMENT/PLAN:     Sedation Plan: Moderate  Patient will undergo Liver Biopsy.    Sanjay Romero MD

## 2023-06-08 NOTE — DISCHARGE SUMMARY
Radiology Post-Procedure Note    Pre Op Diagnosis: right liver mass    Post Op Diagnosis: right liver mass    Procedure: right liver biopsy    Procedure performed by: Heather    Written Informed Consent Obtained: Yes    Specimen Removed: YES     Estimated Blood Loss: Minimal    Findings:   Stnadard CT Liver Biopsy    Patient tolerated procedure well.    @SIG@

## 2023-06-09 ENCOUNTER — TELEPHONE (OUTPATIENT)
Dept: SURGERY | Facility: HOSPITAL | Age: 63
End: 2023-06-09
Payer: MEDICAID

## 2023-06-09 VITALS
OXYGEN SATURATION: 98 % | HEIGHT: 74 IN | HEART RATE: 76 BPM | WEIGHT: 212.06 LBS | DIASTOLIC BLOOD PRESSURE: 66 MMHG | TEMPERATURE: 98 F | SYSTOLIC BLOOD PRESSURE: 124 MMHG | RESPIRATION RATE: 20 BRPM | BODY MASS INDEX: 27.22 KG/M2

## 2023-06-12 LAB
DHEA SERPL-MCNC: NORMAL
ESTROGEN SERPL-MCNC: NORMAL PG/ML
INSULIN SERPL-ACNC: NORMAL U[IU]/ML
LAB AP CLINICAL INFORMATION: NORMAL
LAB AP GROSS DESCRIPTION: NORMAL
LAB AP INTRA OP: NORMAL
LAB AP REPORT FOOTNOTES: NORMAL
T3RU NFR SERPL: NORMAL %

## 2023-06-26 DIAGNOSIS — C20 ADENOCARCINOMA OF RECTUM, STAGE 4: ICD-10-CM

## 2023-06-26 DIAGNOSIS — R64 CANCER CACHEXIA: ICD-10-CM

## 2023-06-26 DIAGNOSIS — R63.5 UNINTENDED WEIGHT GAIN: ICD-10-CM

## 2023-06-26 DIAGNOSIS — C78.7 ADENOCARCINOMA OF RECTUM METASTATIC TO LIVER: ICD-10-CM

## 2023-06-26 DIAGNOSIS — C20 RECTAL CANCER: ICD-10-CM

## 2023-06-26 DIAGNOSIS — C20 ADENOCARCINOMA OF RECTUM METASTATIC TO LIVER: ICD-10-CM

## 2023-06-26 RX ORDER — HYDROCODONE BITARTRATE AND ACETAMINOPHEN 10; 325 MG/1; MG/1
1 TABLET ORAL EVERY 4 HOURS PRN
Qty: 60 TABLET | Refills: 0 | Status: SHIPPED | OUTPATIENT
Start: 2023-06-26

## 2023-07-02 PROBLEM — R63.4 UNINTENTIONAL WEIGHT LOSS: Status: ACTIVE | Noted: 2023-07-02

## 2023-07-03 ENCOUNTER — HOSPITAL ENCOUNTER (INPATIENT)
Facility: HOSPITAL | Age: 63
LOS: 7 days | Discharge: HOSPICE | DRG: 872 | End: 2023-07-10
Attending: INTERNAL MEDICINE | Admitting: INTERNAL MEDICINE
Payer: MEDICAID

## 2023-07-03 ENCOUNTER — OFFICE VISIT (OUTPATIENT)
Dept: HEMATOLOGY/ONCOLOGY | Facility: CLINIC | Age: 63
DRG: 872 | End: 2023-07-03
Attending: INTERNAL MEDICINE
Payer: MEDICAID

## 2023-07-03 VITALS
RESPIRATION RATE: 20 BRPM | DIASTOLIC BLOOD PRESSURE: 52 MMHG | HEART RATE: 75 BPM | BODY MASS INDEX: 28.71 KG/M2 | TEMPERATURE: 99 F | WEIGHT: 212 LBS | HEIGHT: 72 IN | SYSTOLIC BLOOD PRESSURE: 80 MMHG | OXYGEN SATURATION: 99 %

## 2023-07-03 DIAGNOSIS — C78.7 ADENOCARCINOMA OF RECTUM METASTATIC TO LIVER: ICD-10-CM

## 2023-07-03 DIAGNOSIS — M79.89 SWELLING OF ARM: ICD-10-CM

## 2023-07-03 DIAGNOSIS — K62.89 RECTAL PAIN: ICD-10-CM

## 2023-07-03 DIAGNOSIS — R06.02 SOB (SHORTNESS OF BREATH): ICD-10-CM

## 2023-07-03 DIAGNOSIS — R63.0 ANOREXIA: ICD-10-CM

## 2023-07-03 DIAGNOSIS — C20 ADENOCARCINOMA OF RECTUM, STAGE 4: ICD-10-CM

## 2023-07-03 DIAGNOSIS — C20 RECTAL CANCER: ICD-10-CM

## 2023-07-03 DIAGNOSIS — R78.81 BACTEREMIA: ICD-10-CM

## 2023-07-03 DIAGNOSIS — R07.9 CHEST PAIN: ICD-10-CM

## 2023-07-03 DIAGNOSIS — I95.9 HYPOTENSION: ICD-10-CM

## 2023-07-03 DIAGNOSIS — E87.6 HYPOKALEMIA: ICD-10-CM

## 2023-07-03 DIAGNOSIS — C79.9 METASTATIC MALIGNANT NEOPLASM, UNSPECIFIED SITE: ICD-10-CM

## 2023-07-03 DIAGNOSIS — K62.5 RECTAL BLEEDING: ICD-10-CM

## 2023-07-03 DIAGNOSIS — C20 ADENOCARCINOMA OF RECTUM METASTATIC TO LIVER: ICD-10-CM

## 2023-07-03 DIAGNOSIS — R63.4 UNINTENTIONAL WEIGHT LOSS: ICD-10-CM

## 2023-07-03 DIAGNOSIS — E86.0 SEVERE DEHYDRATION: Primary | ICD-10-CM

## 2023-07-03 DIAGNOSIS — R59.0 PELVIC LYMPHADENOPATHY: ICD-10-CM

## 2023-07-03 DIAGNOSIS — E87.1 HYPO-OSMOLAR HYPONATREMIA: ICD-10-CM

## 2023-07-03 DIAGNOSIS — D64.9 ANEMIA, UNSPECIFIED TYPE: ICD-10-CM

## 2023-07-03 DIAGNOSIS — C20 ADENOCARCINOMA OF RECTUM, STAGE 4: Primary | ICD-10-CM

## 2023-07-03 DIAGNOSIS — N17.9 AKI (ACUTE KIDNEY INJURY): ICD-10-CM

## 2023-07-03 DIAGNOSIS — I10 PRIMARY HYPERTENSION: ICD-10-CM

## 2023-07-03 LAB
ABORH RETYPE: NORMAL
ABS NEUT CALC (OHS): 34.52 X10(3)/MCL (ref 2.1–9.2)
ALBUMIN SERPL-MCNC: 1.6 G/DL (ref 3.4–4.8)
ALBUMIN/GLOB SERPL: 0.3 RATIO (ref 1.1–2)
ALP SERPL-CCNC: 714 UNIT/L (ref 40–150)
ALT SERPL-CCNC: 27 UNIT/L (ref 0–55)
ANISOCYTOSIS BLD QL SMEAR: SLIGHT
AST SERPL-CCNC: 74 UNIT/L (ref 5–34)
BASOPHILS NFR BLD MANUAL: 0.39 X10(3)/MCL (ref 0–0.2)
BASOPHILS NFR BLD MANUAL: 1 % (ref 0–2)
BILIRUBIN DIRECT+TOT PNL SERPL-MCNC: 2 MG/DL
BUN SERPL-MCNC: 36.4 MG/DL (ref 8.4–25.7)
CALCIUM SERPL-MCNC: 9.2 MG/DL (ref 8.8–10)
CHLORIDE SERPL-SCNC: 91 MMOL/L (ref 98–107)
CO2 SERPL-SCNC: 20 MMOL/L (ref 23–31)
CREAT SERPL-MCNC: 2.57 MG/DL (ref 0.73–1.18)
ERYTHROCYTE [DISTWIDTH] IN BLOOD BY AUTOMATED COUNT: 15.8 % (ref 11.5–17)
GFR SERPLBLD CREATININE-BSD FMLA CKD-EPI: 27 MLS/MIN/1.73/M2
GLOBULIN SER-MCNC: 5.6 GM/DL (ref 2.4–3.5)
GLUCOSE SERPL-MCNC: 188 MG/DL (ref 82–115)
GROUP & RH: NORMAL
HCT VFR BLD AUTO: 22 % (ref 42–52)
HGB BLD-MCNC: 7 G/DL (ref 14–18)
HYPOCHROMIA BLD QL SMEAR: ABNORMAL
INDIRECT COOMBS GEL: NORMAL
LACTATE SERPL-SCNC: 2.9 MMOL/L (ref 0.5–2.2)
LACTATE SERPL-SCNC: 5 MMOL/L (ref 0.5–2.2)
LYMPHOCYTES NFR BLD MANUAL: 1.55 X10(3)/MCL
LYMPHOCYTES NFR BLD MANUAL: 4 % (ref 13–40)
MCH RBC QN AUTO: 24.9 PG (ref 27–31)
MCHC RBC AUTO-ENTMCNC: 31.8 G/DL (ref 33–36)
MCV RBC AUTO: 78.3 FL (ref 80–94)
METAMYELOCYTES NFR BLD MANUAL: 1 %
MICROCYTES BLD QL SMEAR: ABNORMAL
MONOCYTES NFR BLD MANUAL: 1.55 X10(3)/MCL (ref 0.1–1.3)
MONOCYTES NFR BLD MANUAL: 4 % (ref 2–11)
MYELOCYTES NFR BLD MANUAL: 1 %
NEUTROPHILS NFR BLD MANUAL: 88 % (ref 47–80)
NEUTS BAND NFR BLD MANUAL: 1 % (ref 0–11)
NRBC BLD AUTO-RTO: 0 %
PLATELET # BLD AUTO: 837 X10(3)/MCL (ref 130–400)
PLATELET # BLD EST: ABNORMAL 10*3/UL
PMV BLD AUTO: 9.8 FL (ref 7.4–10.4)
POIKILOCYTOSIS BLD QL SMEAR: SLIGHT
POLYCHROMASIA BLD QL SMEAR: SLIGHT
POTASSIUM SERPL-SCNC: 3.3 MMOL/L (ref 3.5–5.1)
PROT SERPL-MCNC: 7.2 GM/DL (ref 5.8–7.6)
RBC # BLD AUTO: 2.81 X10(6)/MCL (ref 4.7–6.1)
SODIUM SERPL-SCNC: 128 MMOL/L (ref 136–145)
SPECIMEN OUTDATE: NORMAL
TROPONIN I SERPL-MCNC: <0.01 NG/ML (ref 0–0.04)
WBC # SPEC AUTO: 38.79 X10(3)/MCL (ref 4.5–11.5)

## 2023-07-03 PROCEDURE — 80053 COMPREHEN METABOLIC PANEL: CPT | Performed by: INTERNAL MEDICINE

## 2023-07-03 PROCEDURE — P9016 RBC LEUKOCYTES REDUCED: HCPCS | Performed by: STUDENT IN AN ORGANIZED HEALTH CARE EDUCATION/TRAINING PROGRAM

## 2023-07-03 PROCEDURE — G0378 HOSPITAL OBSERVATION PER HR: HCPCS

## 2023-07-03 PROCEDURE — 86920 COMPATIBILITY TEST SPIN: CPT | Performed by: STUDENT IN AN ORGANIZED HEALTH CARE EDUCATION/TRAINING PROGRAM

## 2023-07-03 PROCEDURE — 11000001 HC ACUTE MED/SURG PRIVATE ROOM

## 2023-07-03 PROCEDURE — 85027 COMPLETE CBC AUTOMATED: CPT | Performed by: INTERNAL MEDICINE

## 2023-07-03 PROCEDURE — 93005 ELECTROCARDIOGRAM TRACING: CPT

## 2023-07-03 PROCEDURE — 99215 PR OFFICE/OUTPT VISIT, EST, LEVL V, 40-54 MIN: ICD-10-PCS | Mod: S$PBB,,, | Performed by: INTERNAL MEDICINE

## 2023-07-03 PROCEDURE — 36430 TRANSFUSION BLD/BLD COMPNT: CPT

## 2023-07-03 PROCEDURE — 84484 ASSAY OF TROPONIN QUANT: CPT | Performed by: INTERNAL MEDICINE

## 2023-07-03 PROCEDURE — 99214 OFFICE O/P EST MOD 30 MIN: CPT | Mod: PBBFAC,25 | Performed by: INTERNAL MEDICINE

## 2023-07-03 PROCEDURE — 99215 OFFICE O/P EST HI 40 MIN: CPT | Mod: S$PBB,,, | Performed by: INTERNAL MEDICINE

## 2023-07-03 PROCEDURE — 87077 CULTURE AEROBIC IDENTIFY: CPT | Performed by: INTERNAL MEDICINE

## 2023-07-03 PROCEDURE — 99285 EMERGENCY DEPT VISIT HI MDM: CPT | Mod: 25,27

## 2023-07-03 PROCEDURE — 63600175 PHARM REV CODE 636 W HCPCS: Performed by: INTERNAL MEDICINE

## 2023-07-03 PROCEDURE — 87154 CUL TYP ID BLD PTHGN 6+ TRGT: CPT | Performed by: INTERNAL MEDICINE

## 2023-07-03 PROCEDURE — 25000003 PHARM REV CODE 250: Performed by: STUDENT IN AN ORGANIZED HEALTH CARE EDUCATION/TRAINING PROGRAM

## 2023-07-03 PROCEDURE — 83605 ASSAY OF LACTIC ACID: CPT | Performed by: INTERNAL MEDICINE

## 2023-07-03 PROCEDURE — 25000003 PHARM REV CODE 250: Performed by: INTERNAL MEDICINE

## 2023-07-03 PROCEDURE — 86900 BLOOD TYPING SEROLOGIC ABO: CPT | Performed by: INTERNAL MEDICINE

## 2023-07-03 RX ORDER — HEPARIN 100 UNIT/ML
500 SYRINGE INTRAVENOUS
Status: CANCELLED | OUTPATIENT
Start: 2023-07-03

## 2023-07-03 RX ORDER — POLYETHYLENE GLYCOL 3350 17 G/17G
17 POWDER, FOR SOLUTION ORAL DAILY
Status: DISCONTINUED | OUTPATIENT
Start: 2023-07-04 | End: 2023-07-10 | Stop reason: HOSPADM

## 2023-07-03 RX ORDER — FLUOROURACIL 50 MG/ML
400 INJECTION, SOLUTION INTRAVENOUS
Status: CANCELLED | OUTPATIENT
Start: 2023-07-03

## 2023-07-03 RX ORDER — SODIUM CHLORIDE AND POTASSIUM CHLORIDE 150; 900 MG/100ML; MG/100ML
INJECTION, SOLUTION INTRAVENOUS
Status: COMPLETED | OUTPATIENT
Start: 2023-07-03 | End: 2023-07-03

## 2023-07-03 RX ORDER — SODIUM CHLORIDE 0.9 % (FLUSH) 0.9 %
10 SYRINGE (ML) INJECTION
Status: CANCELLED | OUTPATIENT
Start: 2023-07-03

## 2023-07-03 RX ORDER — HYDROCODONE BITARTRATE AND ACETAMINOPHEN 10; 325 MG/1; MG/1
1 TABLET ORAL EVERY 4 HOURS PRN
Status: DISCONTINUED | OUTPATIENT
Start: 2023-07-03 | End: 2023-07-03

## 2023-07-03 RX ORDER — MORPHINE SULFATE 2 MG/ML
2 INJECTION, SOLUTION INTRAMUSCULAR; INTRAVENOUS EVERY 6 HOURS PRN
Status: DISCONTINUED | OUTPATIENT
Start: 2023-07-03 | End: 2023-07-10 | Stop reason: HOSPADM

## 2023-07-03 RX ORDER — METOPROLOL SUCCINATE 50 MG/1
50 TABLET, EXTENDED RELEASE ORAL NIGHTLY
Status: DISCONTINUED | OUTPATIENT
Start: 2023-07-03 | End: 2023-07-05

## 2023-07-03 RX ORDER — NALOXONE HCL 0.4 MG/ML
0.02 VIAL (ML) INJECTION
Status: DISCONTINUED | OUTPATIENT
Start: 2023-07-03 | End: 2023-07-10 | Stop reason: HOSPADM

## 2023-07-03 RX ORDER — MORPHINE SULFATE 15 MG/1
15 TABLET, FILM COATED, EXTENDED RELEASE ORAL EVERY 12 HOURS
Status: DISCONTINUED | OUTPATIENT
Start: 2023-07-03 | End: 2023-07-10 | Stop reason: HOSPADM

## 2023-07-03 RX ORDER — EPINEPHRINE 0.3 MG/.3ML
0.3 INJECTION SUBCUTANEOUS ONCE AS NEEDED
Status: CANCELLED | OUTPATIENT
Start: 2023-07-03

## 2023-07-03 RX ORDER — IBUPROFEN 200 MG
24 TABLET ORAL
Status: DISCONTINUED | OUTPATIENT
Start: 2023-07-03 | End: 2023-07-10 | Stop reason: HOSPADM

## 2023-07-03 RX ORDER — IBUPROFEN 200 MG
16 TABLET ORAL
Status: DISCONTINUED | OUTPATIENT
Start: 2023-07-03 | End: 2023-07-10 | Stop reason: HOSPADM

## 2023-07-03 RX ORDER — SODIUM CHLORIDE 0.9 % (FLUSH) 0.9 %
10 SYRINGE (ML) INJECTION EVERY 12 HOURS PRN
Status: DISCONTINUED | OUTPATIENT
Start: 2023-07-03 | End: 2023-07-10 | Stop reason: HOSPADM

## 2023-07-03 RX ORDER — HYDROCODONE BITARTRATE AND ACETAMINOPHEN 500; 5 MG/1; MG/1
TABLET ORAL
Status: DISCONTINUED | OUTPATIENT
Start: 2023-07-03 | End: 2023-07-10 | Stop reason: HOSPADM

## 2023-07-03 RX ORDER — HYDROCODONE BITARTRATE AND ACETAMINOPHEN 10; 325 MG/1; MG/1
1 TABLET ORAL EVERY 6 HOURS PRN
Status: DISCONTINUED | OUTPATIENT
Start: 2023-07-03 | End: 2023-07-10 | Stop reason: HOSPADM

## 2023-07-03 RX ORDER — PANTOPRAZOLE SODIUM 40 MG/1
40 TABLET, DELAYED RELEASE ORAL DAILY
Status: DISCONTINUED | OUTPATIENT
Start: 2023-07-04 | End: 2023-07-10 | Stop reason: HOSPADM

## 2023-07-03 RX ORDER — SODIUM CHLORIDE, SODIUM LACTATE, POTASSIUM CHLORIDE, CALCIUM CHLORIDE 600; 310; 30; 20 MG/100ML; MG/100ML; MG/100ML; MG/100ML
INJECTION, SOLUTION INTRAVENOUS CONTINUOUS
Status: DISCONTINUED | OUTPATIENT
Start: 2023-07-03 | End: 2023-07-06

## 2023-07-03 RX ORDER — MEGESTROL ACETATE 40 MG/ML
800 SUSPENSION ORAL DAILY
Status: DISCONTINUED | OUTPATIENT
Start: 2023-07-04 | End: 2023-07-10 | Stop reason: HOSPADM

## 2023-07-03 RX ORDER — GLUCAGON 1 MG
1 KIT INJECTION
Status: DISCONTINUED | OUTPATIENT
Start: 2023-07-03 | End: 2023-07-10 | Stop reason: HOSPADM

## 2023-07-03 RX ORDER — DIPHENHYDRAMINE HYDROCHLORIDE 50 MG/ML
50 INJECTION INTRAMUSCULAR; INTRAVENOUS ONCE AS NEEDED
Status: CANCELLED | OUTPATIENT
Start: 2023-07-03

## 2023-07-03 RX ADMIN — POTASSIUM CHLORIDE AND SODIUM CHLORIDE: 900; 150 INJECTION, SOLUTION INTRAVENOUS at 05:07

## 2023-07-03 RX ADMIN — METOPROLOL SUCCINATE 50 MG: 50 TABLET, EXTENDED RELEASE ORAL at 10:07

## 2023-07-03 RX ADMIN — MORPHINE SULFATE 15 MG: 15 TABLET, EXTENDED RELEASE ORAL at 10:07

## 2023-07-03 RX ADMIN — POTASSIUM BICARBONATE 50 MEQ: 977.5 TABLET, EFFERVESCENT ORAL at 10:07

## 2023-07-03 RX ADMIN — SODIUM CHLORIDE 2838 ML: 9 INJECTION, SOLUTION INTRAVENOUS at 04:07

## 2023-07-03 NOTE — Clinical Note
Orders for today:   Start chemotherapy with Avastin/FOLFOX every 2 weeks ASAP without need delay  Chemo teaching with nursing staff within a week Re-stage with contrast enhanced CT scans of C/A/P 2 months after starting chemotherapy  Check CEA level the day that he starts chemotherapy, then, every month CBC and CMP every couple of weeks before each cycle of chemotherapy  Follow-up with NP within a week for chemo teaching.    Follow-up visit with me in 1 month.

## 2023-07-03 NOTE — PROGRESS NOTES
History:  Past Medical History:   Diagnosis Date    CAD (coronary artery disease)     Cancer     HLD (hyperlipidemia)     HTN (hypertension)     MI (myocardial infarction)     Obesity, unspecified    Past medical history:  CAD, S/P CABG x3 at [facility] after an MI in ; prior to that, he had PCI mixed dyslipidemia.  Primary hypertension.  History of MI. GERD.  Social history:  Single.  Lives in [place], [place].  Has a 29-year-old son and a 22-year-old daughter.  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  Has been chewing tobacco for 40 years.  Does not smoke.  No alcohol or illicit drug abuse.    Family history:  Negative for cancers.  Health maintenance:  Primary care provider in [place].  Had never had screening colonoscopy performed prior to the most recent colonoscopy leading to diagnosis of rectal cancer.  Past Surgical History:   Procedure Laterality Date    COLONOSCOPY, WITH 1 OR MORE BIOPSIES  2023    Procedure: COLONOSCOPY, WITH 1 OR MORE BIOPSIES;  Surgeon: Placido Mckeon MD;  Location: [facility] ENDOSCOPY;  Service: Endoscopy;;    COLONOSCOPY, WITH POLYPECTOMY USING SNARE N/A 2023    Procedure: COLONOSCOPY, WITH POLYPECTOMY USING SNARE;  Surgeon: Placido Mckeon MD;  Location: [facility] ENDOSCOPY;  Service: Endoscopy;  Laterality: N/A;    CORONARY ARTERY BYPASS GRAFT  2021    CORONARY STENT PLACEMENT      PLACEMENT, MEDIPORT N/A 2023    Procedure: Placement, Mediport;  Surgeon: Joe Hendrix Jr., MD;  Location: [facility] OR;  Service: General;  Laterality: N/A;      Social History     Socioeconomic History    Marital status:     Number of children: 2   Tobacco Use    Smoking status: Former     Types: Cigarettes     Quit date:      Years since quittin.5   Substance and Sexual Activity    Alcohol use: Not Currently     Alcohol/week: 35.0 standard drinks     Types: 25 Cans of beer, 10 Shots of liquor per week    Drug use: Not Currently     Types:  Marijuana     Comment: weekends    Sexual activity: Not Currently      No family history on file.     Reason for Follow-up:  -adenocarcinoma of rectum, MMR proficient, diagnosed on colonoscopy 05/04/2023   -metastatic pelvic lymphadenopathy on CT abdomen pelvis with contrast 04/24/2023   -too numerous to count multiple liver metastases, largest 5.1 cm, on CT scans of abdomen pelvis with contrast 04/24/2023    History of Present Illness:   No chief complaint on file.        Oncologic/Hematologic History:  Oncology History   Adenocarcinoma of rectum, stage 4   5/4/2023 Cancer Staged    Staging form: Colon and Rectum, AJCC 8th Edition  - Clinical stage from 5/4/2023: Stage JOSE (cTX, cN1, cM1a)     5/26/2023 Initial Diagnosis    Adenocarcinoma of rectum, stage 4     5/31/2023 -  Chemotherapy    Treatment Summary   Plan Name: OP COLORECTAL mFOLFOX + BEVACIZUMAB Q2W  Treatment Goal: Palliative  Status: Active  Start Date: 5/31/2023 (Planned)  End Date: 4/19/2024 (Planned)  Provider: Jamaal Guevara MD  Chemotherapy: fluorouraciL injection 915 mg, 400 mg/m2 = 915 mg, Intravenous, Clinic/HOD 1 time, 0 of 24 cycles  oxaliplatin (ELOXATIN) 85 mg/m2 = 195 mg in dextrose 5 % (D5W) 539 mL chemo infusion, 85 mg/m2 = 195 mg, Intravenous, Clinic/HOD 1 time, 0 of 24 cycles  bevacizumab-awwb (MVASI) 5 mg/kg = 505 mg in sodium chloride 0.9% 100 mL infusion, 5 mg/kg = 505 mg, Intravenous, Clinic/HOD 1 time, 0 of 24 cycles     5/31/2023 Cancer Staged    Staging form: Colon and Rectum, AJCC 8th Edition  - Clinical stage from 5/31/2023: Stage JOSE (cT2, cN2b, cM1a)     62-year-old gentleman, referred from [facility] surgery, with rectal mass.      05/09/2023: Surgery note:   '62 year old male with past medical history of CAD s/p CABG 2021 presents for evaluation of rectal mass. He reports that since 1 year ago he has had rectal bleeding that presents with stool and is often dark and with clots, but can also be light as well. He had a  colonoscopy last week which showed the mass as well as a polyp that was removed. He has had irregular bowel habits with different consistencies of blood in his stool. He has significant pain in his rectum and reports weight loss and fatigue, but no fever or chills, urinary issues, nausea, vomiting, or diarrhea.   No abdominal surgeries in the past. Takes a statin, antihypertensives and metoprolol. NKDA.'    Investigations reviewed:    04/24/2023: CT A/P with contrast (GI bleed, rectal bleed for 1 year, lower abdominal pain)  -Findings seen consistent with malignancy in the rectum with metastatic pelvic lymphadenopathy seen and metastatic foci seen throughout the liver  (Multiple liver masses, too numerous to count, predominantly hypoattenuated, consistent with metastatic foci; reference largest liver lesion segment 3, 5.1 x 5.1 cm; circumferential wall thickening in the region of the rectum with a mass in the region of rectum; multiple lymph nodes in the pelvis, largest lymph nodes in the right deep hemipelvis 1.4 x 2 cm)    05/04/2023: Colonoscopy (hematochezia, suspecting colorectal cancer):  -rectal mass 0-2 cm from anal verge (10 cm diameter firm rectal mass 1-2 cm from anal verge; located between 11-and 7-O'clock positions)  -5 mm polyp proximal ascending colon, sessile, removed  -10 mm polyp rectosigmoid colon, sessile, not removed  -repeat colonoscopy in 1 year for surveillance    05/04/2023:  1. Ileocecal valve colon polyp, polypectomy:  Tubular adenoma; no malignancy  2. Rectal mass, biopsy:  Adenocarcinoma; MMR proficient    Labs reviewed:  04/24/2023:  Hemoglobin 13.6.  Platelets 444 K. CMP essentially unremarkable.  Albumin 3.0.    05/26/2023:   Very pleasant gentleman who presents for initial medical oncology consultation, accompanied by his aunt.  # it all started with rectal bleeding, 1st noticed on 05/04/2022.  Used to notice blood clots in the toilet bowel, as well as blood on toilet vipes.  Says  that he has been waiting for diagnostic colonoscopy but could not get an earlier appointment.  # for last 5 weeks, rectal pain and lower abdominal pain has worsened to the point of being 20/10 in intensity, on a scale of 1-10.  Currently, he is not on any pain medications.    # lately, rectal bleeding has diminished  # complains of nausea with pain  # anorexia.  Has lost 30 lb in last 3 months.  Has a craving for crawfish.  Can not eat anything else.    # progressive fatigue.  Now, ECOG 2.  # can not sit for a long period of time because of rectal pain.    # tenesmus.  Has to go to bathroom multiple X, up to 30 5 X, but passes very small amount of stool.  Loose stools.  # no abdominal distention, chest pain, cough, dyspnea, etc..  No lumps or lymphadenopathy.      Interval History:  [No matching plan found]   OP COLORECTAL mFOLFOX + BEVACIZUMAB Q2W     07/03/2023:  -biomarker testing (liver biopsy 06/08/2023):  KRAS mutation pos; TMB-high (14.3); PD-L1 expression for Keytruda pos based on CPS (5); no PD-L1 expression for Keytruda based on TPS (< 1%); BRAF mutation negative, NRAS mutation negative, HER2 negative, MMR proficient, NTRKC 1/2/3 negative, microsatellite stable  -05/26/2023:  Hemoglobin 11.4.  MCV normal.  Platelets 635 K (almost certainly, reactive).  Alk-phos 254, rising.  CEA level 267.87, elevated.  -05/31/2023:  CT chest with contrast (staging workup) pelvic comparison:  CT abdomen pelvis 04/24/2023; chest x-ray 04/06/2021):   1. Right upper lung zone minimal nodule probably is benign.  Attention to follow-up CT chest in 4 months is recommended.   2. Otherwise, no concerning chest findings.   3. Multiple known hepatic metastatic lesions.  -05/31/2023:  MRI rectum with and without contrast (comparison: CT 04/24/2023):  Large mid to distal rectal mass (essentially circumferential; the most distal extent of the mass is 5 mm from the anal verge; mass measures 85 mm long; extension through the muscularis  propria; mass abuts the pelvic floor on the right but no convincing invasion of it; no convincing involvement of prostate; large right perirectal metastatic deposit 25 mm; approximately 10 perirectal lymph nodes overall)  -06/05/2023:  Right subclavian MediPort placed  -06/06/2020: CTs C/A/P with contrast begin (comparison: CT chest 05/31/2023; CT abdomen pelvis 04/24/2023):   Persistent rectal mass and regional metastatic lymphadenopathy within the [place] rectal fat, stable.   Multiple metastatic liver masses, significantly larger in the interval (multiple liver metastasis, significantly larger; 1 of the larger lesions posterior right hepatic lobe 6.7 cm, previously 3.8 cm)  Right upper lobe pulmonary nodule stable.  Otherwise no evidence of metastatic disease to the chest.  -06/08/2022:  CT-guided right liver lesion biopsy: Adenocarcinoma, consistent with a colorectal primary  Brought for a follow-up visit by his aunt.  Looks chronically ill.  Wheelchair bound.  Extremely weak.  In last 3 weeks, performance status has declined dramatically; now, ECOG 4.  No appetite.  Generalized pains, 10/10 severity.  Very painful bowel movements.  Sometimes, clotted blood with the stool.  Abdominal pains as well, mainly upper abdominal.  No jaundice, fevers, or chills.  Nausea but no vomiting.  Blurry vision and vision changes; denies unusual headaches or any other focal neurological symptoms.  Very poor appetite.  Night sweats and hot flashes.  Cough and wheezing.  Burning with micturition.      Medications:  Current Outpatient Medications on File Prior to Visit   Medication Sig Dispense Refill    amLODIPine (NORVASC) 5 MG tablet Take 5 mg by mouth once daily.      hydroCHLOROthiazide (HYDRODIURIL) 25 MG tablet Take 25 mg by mouth.      HYDROcodone-acetaminophen (NORCO)  mg per tablet Take 1 tablet by mouth every 4 (four) hours as needed for Pain. 60 tablet 0    losartan (COZAAR) 100 MG tablet Take 100 mg by mouth every  morning.      megestroL (MEGACE) 400 mg/10 mL (40 mg/mL) Susp Take 20 mLs (800 mg total) by mouth once daily. 600 mL 1    metoprolol succinate (TOPROL-XL) 25 MG 24 hr tablet Take 50 mg by mouth nightly.      omeprazole (PRILOSEC) 40 MG capsule Take 40 mg by mouth.      ondansetron (ZOFRAN) 4 MG tablet Take 1 tablet (4 mg total) by mouth every 6 (six) hours as needed for Nausea. 30 tablet 1    polyethylene glycol (MOVIPREP) 100-7.5-2.691 gram solution Take a directed per instructions provided by GI Clinic. 1 kit 0     Current Facility-Administered Medications on File Prior to Visit   Medication Dose Route Frequency Provider Last Rate Last Admin    lactated ringers infusion   Intravenous Continuous Brianne Tubbs MD 10 mL/hr at 06/05/23 0615 New Bag at 06/05/23 0615       Review of Systems:   All systems reviewed and found to be negative except for the symptoms detailed above    Physical Examination:   VITAL SIGNS:   Vitals:    07/03/23 1503   BP: (!) 80/52   Pulse: 75   Resp: 20   Temp: 98.7 °F (37.1 °C)       GENERAL:  In no apparent distress.    HEAD:  No signs of head trauma.  EYES:  Pupils are equal.  Extraocular motions intact.    EARS:  Hearing grossly intact.  MOUTH:  Oropharynx is normal.   NECK:  No adenopathy, no JVD.     CHEST:  Chest with clear breath sounds bilaterally.  No wheezes, rales, rhonchi.    CARDIAC:  Regular rate and rhythm.  S1 and S2, without murmurs, gallops, rubs.  VASCULAR:  No Edema.  Peripheral pulses normal and equal in all extremities.  ABDOMEN:  Soft, without detectable tenderness.  No sign of distention.  No   rebound or guarding, and no masses palpated.   Bowel Sounds normal.  MUSCULOSKELETAL:  Good range of motion of all major joints. Extremities without clubbing, cyanosis or edema.    NEUROLOGIC EXAM:  Alert and oriented x 3.  No focal sensory or strength deficits.   Speech normal.  Follows commands.  PSYCHIATRIC:  Mood normal.    No results for input(s): CBC in the last 72  hours.   No results for input(s): CMP in the last 72 hours.     Assessment:  Problem List Items Addressed This Visit          Oncology    Adenocarcinoma of rectum, stage 4 - Primary    Adenocarcinoma of rectum metastatic to liver       Endocrine    Unintentional weight loss       GI    Rectal bleeding    Rectal pain       Other    Pelvic lymphadenopathy    Anorexia     Metastatic adenocarcinoma rectum:  -hematochezia for 1 year, starting 05/2022; according to him, he could not get a sooner appointment for colonoscopy  -adenocarcinoma of rectum, MMR proficient, diagnosed on colonoscopy 05/04/2023   -metastatic pelvic lymphadenopathy on CT abdomen pelvis with contrast 04/24/2023   -too numerous to count multiple liver metastases, largest 5.1 cm, on CT scans of abdomen pelvis with contrast 04/24/2023  -rectal pain, 20/10 severity; nausea; 30 lb weight loss over 3 months; anorexia; declining performance status, ECOG 2  -05/26/2023:  CEA level 267.87, elevated.  -on MRI 05/31/2023, large mid to distal rectal mass, essentially circumferential, most distal extent of the mass 5 mm from the anal verge, mass 85 mm long, extension through the muscularis propria  -on MRI 05/31/2023, large right perirectal metastatic deposit 25 mm  -on MRI 05/31/2023, approximately 10 perirectal lymph nodes overall  -06/05/2022:  Right subclavian MediPort placed  -multiple liver metastases significantly larger on CTs C/A/P 06/06/2023  -percutaneous liver biopsy 06/08/2022: Adenocarcinoma, consistent with a colorectal primary  >> cT2 cN2b M1a, stage JOSE  - 07/03/2023: In last 3 weeks, dramatic decline in  performance status, now ECOG 4; very weak; generalized pains, 10/10 severity; anorectal pain; clotted blood with bowel movements; abdominal pains; no appetite; almost bed-bound      Sites of disease:  Large mid to distal rectal mass on MRI.  Approximately 10 perirectal lymph node on MRI  Multiple liver metastasis      Biomarker  testing:  -biomarker testing (liver biopsy 06/08/2023):    KRAS mutation pos;   TMB-high (14.3);   PD-L1 expression for Keytruda pos based on CPS (5); no PD-L1 expression for Keytruda based on TPS (< 1%);   BRAF mutation negative, NRAS mutation negative, HER2 negative, MMR proficient, NTRKC 1/2/3 negative, microsatellite stable      Plan:  Metastatic adenocarcinoma rectum   Large mid to distal rectal mass; 10 perirectal lymph nodes on MRI; multiple liver metastases, too numerous to count  MMR proficient.  Microsatellite stable.    KRAS mutation pos.  Rectal pain, nausea, anorexia, 30 lb weight loss/3 months  CEA level 267.87 (05/26/2023)  >>>  - 07/03/2023: In last 3 weeks, dramatic decline in  performance status, now ECOG 4; very weak; generalized pains, 10/10 severity; anorectal pain; clotted blood with bowel movements; abdominal pains; no appetite; almost bed-bound  >>>  At this point, given dramatic decline in performance status, not a candidate for palliative chemotherapy at all.  Needs end of life care with hospice.  Expected to die soon.    These issues were discussed at length with the patient and his aunt.  Shared results of relevant investigations including liver biopsy report as well as recent CT scans.  Mr. Mathews wishes to be hospitalized for pain control.  I think this is reasonable.  We will transport him to the ER to get hospitalized for pain control as well as for brain MRI scan to rule out brain metastases.  He is agreeable with plans of hospice.    Above discussed at length with the patient and his aunt.  All questions answered.    Discussed labs and scans and gave him copies of relevant reports.    Clearly discussed that prognosis is very poor at this point.  Given extremely poor and declining performance status, palliative systemic therapy is contraindicated.   Mr. Mathews and his aunt are agreeable with plans of hospice.      Follow-up:  No follow-ups on file.

## 2023-07-03 NOTE — ED PROVIDER NOTES
Encounter Date: 7/3/2023       History     Chief Complaint   Patient presents with    Abdominal Pain     Pt reports dx of colo-rectal cancer, Just left Dr. Bernstein's office and was told to come down to ER for admit to hospital for pain control.      Presents from Oncology Clinic for pain management and hospice care. Pt with Hx of Colorectal CA, was scheduled for chemotherapy in 2 weeks but disease have progress rapidly for which hospice care was recommended. Pt states not in pain at this time.    The history is provided by the patient and a relative.   Review of patient's allergies indicates:  No Known Allergies  Past Medical History:   Diagnosis Date    CAD (coronary artery disease)     Cancer     HLD (hyperlipidemia)     HTN (hypertension)     MI (myocardial infarction)     Obesity, unspecified      Past Surgical History:   Procedure Laterality Date    COLONOSCOPY, WITH 1 OR MORE BIOPSIES  2023    Procedure: COLONOSCOPY, WITH 1 OR MORE BIOPSIES;  Surgeon: Placido Mckeon MD;  Location: [facility] ENDOSCOPY;  Service: Endoscopy;;    COLONOSCOPY, WITH POLYPECTOMY USING SNARE N/A 2023    Procedure: COLONOSCOPY, WITH POLYPECTOMY USING SNARE;  Surgeon: Placido Mckeon MD;  Location: [facility] ENDOSCOPY;  Service: Endoscopy;  Laterality: N/A;    CORONARY ARTERY BYPASS GRAFT  2021    CORONARY STENT PLACEMENT      PLACEMENT, MEDIPORT N/A 2023    Procedure: Placement, Mediport;  Surgeon: Joe Hendrix Jr., MD;  Location: [facility] OR;  Service: General;  Laterality: N/A;     No family history on file.  Social History     Tobacco Use    Smoking status: Former     Types: Cigarettes     Quit date:      Years since quittin.5   Substance Use Topics    Alcohol use: Not Currently     Alcohol/week: 35.0 standard drinks     Types: 25 Cans of beer, 10 Shots of liquor per week    Drug use: Not Currently     Types: Marijuana     Comment: weekends     Review of Systems   Constitutional:  Positive for appetite  change. Negative for fever.   HENT:  Negative for sore throat.    Respiratory:  Negative for shortness of breath.    Cardiovascular:  Negative for chest pain.   Gastrointestinal:  Negative for nausea.   Genitourinary:  Negative for dysuria.   Musculoskeletal:  Negative for back pain.   Skin:  Negative for rash.   Neurological:  Negative for weakness.   Hematological:  Does not bruise/bleed easily.   All other systems reviewed and are negative.    Physical Exam     Initial Vitals [07/03/23 1546]   BP Pulse Resp Temp SpO2   (!) 78/52 101 18 -- 100 %      MAP       --         Physical Exam    Nursing note and vitals reviewed.  Constitutional: He appears well-developed and well-nourished.   HENT:   Head: Normocephalic and atraumatic.   Mouth/Throat: Oropharynx is clear and moist.   Eyes: Conjunctivae and EOM are normal. Pupils are equal, round, and reactive to light.   Neck: Neck supple.   Normal range of motion.  Cardiovascular:  Regular rhythm, normal heart sounds and intact distal pulses.           Pulmonary/Chest: Breath sounds normal. No respiratory distress.   Abdominal: Abdomen is soft. Bowel sounds are normal. He exhibits no distension. There is no abdominal tenderness. There is no rebound and no guarding.   Musculoskeletal:         General: No edema. Normal range of motion.      Cervical back: Normal range of motion and neck supple.     Neurological: He is alert and oriented to person, place, and time. He has normal strength. GCS score is 15. GCS eye subscore is 4. GCS verbal subscore is 5. GCS motor subscore is 6.   Skin: Skin is warm and dry. No rash noted.   Psychiatric: His behavior is normal.       ED Course   Procedures  Labs Reviewed   COMPREHENSIVE METABOLIC PANEL - Abnormal; Notable for the following components:       Result Value    Sodium Level 128 (*)     Potassium Level 3.3 (*)     Chloride 91 (*)     Carbon Dioxide 20 (*)     Glucose Level 188 (*)     Blood Urea Nitrogen 36.4 (*)     Creatinine  2.57 (*)     Albumin Level 1.6 (*)     Globulin 5.6 (*)     Albumin/Globulin Ratio 0.3 (*)     Bilirubin Total 2.0 (*)     Alkaline Phosphatase 714 (*)     Aspartate Aminotransferase 74 (*)     All other components within normal limits   LACTIC ACID, PLASMA - Abnormal; Notable for the following components:    Lactic Acid Level 5.0 (*)     All other components within normal limits   CBC WITH DIFFERENTIAL - Abnormal; Notable for the following components:    WBC 38.79 (*)     RBC 2.81 (*)     Hgb 7.0 (*)     Hct 22.0 (*)     MCV 78.3 (*)     MCH 24.9 (*)     MCHC 31.8 (*)     Platelet 837 (*)     All other components within normal limits   MANUAL DIFFERENTIAL - Abnormal; Notable for the following components:    Neutrophils % 88 (*)     Lymphs % 4 (*)     Neutrophils Abs Calc 34.5231 (*)     Basophils Abs 0.3879 (*)     Monocytes Abs 1.5516 (*)     Platelets Increased (*)     Anisocytosis Slight (*)     Poikilocytosis Slight (*)     Microcytosis 1+ (*)     Polychromasia Slight (*)     Hypochromasia 1+ (*)     All other components within normal limits   TROPONIN I - Normal   BLOOD CULTURE OLG   BLOOD CULTURE OLG   CBC W/ AUTO DIFFERENTIAL    Narrative:     The following orders were created for panel order CBC auto differential.  Procedure                               Abnormality         Status                     ---------                               -----------         ------                     CBC with Differential[[id number]]        Abnormal            Final result               Manual Differential[[id number]]          Abnormal            Final result                 Please view results for these tests on the individual orders.   URINALYSIS, REFLEX TO URINE CULTURE   EXTRA TUBES    Narrative:     The following orders were created for panel order EXTRA TUBES.  Procedure                               Abnormality         Status                     ---------                               -----------         ------                      Light Blue Top Hold[[id number]]                              In process                 Pink Top Hold[[id number]]                                    In process                   Please view results for these tests on the individual orders.   LIGHT BLUE TOP HOLD   PINK TOP HOLD   LACTIC ACID, PLASMA   TYPE & SCREEN   ABORH RETYPE        ECG Results              EKG 12-lead (In process)  Result time 07/03/23 16:08:48      In process by Interface, Lab In [facility] (07/03/23 16:08:48)                   Narrative:    Test Reason : I95.9,    Vent. Rate : 082 BPM     Atrial Rate : 082 BPM     P-R Int : 170 ms          QRS Dur : 126 ms      QT Int : 412 ms       P-R-T Axes : 060 -17 030 degrees     QTc Int : 481 ms    Normal sinus rhythm  Nonspecific intraventricular block  Possible Inferior infarct ,age undetermined  Abnormal ECG  No previous ECGs available    Referred By: [email or web address]   SELF           Confirmed By:                                   Imaging Results              X-Ray Chest AP Portable (Final result)  Result time 07/03/23 16:28:43      Final result by Yady Guillermo MD (07/03/23 16:28:43)                   Impression:      No acute abnormality of the chest.      Electronically signed by: Yady Guillermo  Date:    07/03/2023  Time:    16:28               Narrative:    EXAMINATION:  XR CHEST AP PORTABLE    CLINICAL HISTORY:  Sepsis;    COMPARISON:  Chest x-ray dated 06/05/2023    FINDINGS:  Right-sided chest wall port catheter has its tip over the superior vena cava.  The heart is normal in size.  The lungs are clear.  There is no pleural effusion or visible pneumothorax.                                       Medications   0.9 % NaCl with KCl 20 mEq infusion (has no administration in time range)   sodium chloride 0.9% bolus 2,838 mL 2,838 mL (2,838 mLs Intravenous New Bag 7/3/23 1617)     Medical Decision Making:   History:   I obtained history from: primary care / consultant.  Old Medical  Records: I decided to obtain old medical records.  Old Records Summarized: records from clinic visits.       <> Summary of Records: Oncology Note:    At this point, given dramatic decline in performance status, not a candidate for palliative chemotherapy at all.  Needs end of life care with hospice.  Independently Interpreted Test(s):   I have ordered and independently interpreted X-rays - see prior notes.  I have ordered and independently interpreted EKG Reading(s) - see prior notes  Clinical Tests:   Lab Tests: Ordered  Radiological Study: Ordered  Medical Tests: Ordered  Other:   I have discussed this case with another health care provider.       <> Summary of the Discussion: Hospital medicine for admission                 5:11 PM    At this time pt improved; VS /74 mmHg; HR 98 bpm, RR 24 rpm, Pulse Ox 98% RA.    Labs reveal leukocytosis, anemia, thrombocytosis, lactic acidosis with kidney failure. Pt with hypovolemic hyponatremia and hypokalemia.    No focus of infection identified, condition associated to terminal malignant disease. Will continue fluid hydration, electrolyte replacement therapy and will admit for Hospice Care       Clinical Impression:   Final diagnoses:  [I95.9] Hypotension  [E86.0] Severe dehydration (Primary)  [N17.9] RAMANDEEP (acute kidney injury)  [D64.9] Anemia, unspecified type  [E87.1] Hypo-osmolar hyponatremia  [E87.6] Hypokalemia  [C79.9] Metastatic malignant neoplasm, unspecified site        ED Disposition Condition    Observation Stable                Roberto Moreno MD  07/03/23 1720       Roberto Moreno MD  07/03/23 1269

## 2023-07-03 NOTE — Clinical Note
Change in plans: Patient is not a candidate for chemotherapy; very poor performance status  Patient needs hospice For now, patient is requesting hospitalization for pain control  Please transferred the patient to ER immediately.

## 2023-07-03 NOTE — Clinical Note
Vision changes; blurry vision; needs brain MRI scan with contrast to rule out brain metastases as well.   Deferred to ER staff.  Patient is going to be transported to ER shortly.

## 2023-07-03 NOTE — H&P
"[facility] Medicine Wards History & Physical Note     Resident Team: [facility] Medicine List 3  Attending Physician: Bushra Mccarty MD  Resident: Phuong  Intern: Nehemias     Date of Admit: 7/3/2023    Chief Complaint     Abdominal Pain (Pt reports dx of colo-rectal cancer, Just left Dr. Bernstein's office and was told to come down to ER for admit to hospital for pain control. )      Subjective:      History of Present Illness:  Ashkan Mathews is a 62 y.o. male with PMH significant for CAD, HLD, HTN and advanced metastatic (stage IV) colo-rectal cancer who was sent down from oncology clinic for admission for hospice. Patient was recently diagnosed with adenocarcinoma of the colon on c-scope 5/4/23 with mets to liver, abdomen, and pelvis on imaging. He presented for initial appointment to discuss treatment options, but was tachycardic and hypotensive so after discussion with his oncologist, was sent to ED to be admitted and set up with hospice. Patient currently complaining of bloody BM's x2 weeks associated with fatigue and SOB. Endorses passing clots in his bowel movements. Denies fever, chills, increased cough/sputum, dysuria. Pt would like to "gain his strength" back and go home with hospice.    In the ED, labs significant for leukocytosis with left shift WBC 38.8, H&H 7/22, MCV 78.3, platelets 837. Pt hyponatremic Na 128, K 3.3; RAMANDEEP with BUN/Cr 36.4/2.57, hypoalbuminemia 1.6. Trop negative, lactate 5. Blood cultures obtained in ED< no consolidation on CXR, port-catheter with tip over SVC in place. Patient initially hypotensive with bP 78/52,  on admission, afebrile satting 100% on room air. Hypotension resolved with IVF resuscitation. Medicine consulted for admission to stabilize patient and assist with hospice coordination.      Past Medical History:  Past Medical History:   Diagnosis Date    CAD (coronary artery disease)     Cancer     HLD (hyperlipidemia)     HTN (hypertension)     MI (myocardial " infarction)     Obesity, unspecified        Past Surgical History:  Past Surgical History:   Procedure Laterality Date    COLONOSCOPY, WITH 1 OR MORE BIOPSIES  2023    Procedure: COLONOSCOPY, WITH 1 OR MORE BIOPSIES;  Surgeon: Placido Mckeon MD;  Location: [facility] ENDOSCOPY;  Service: Endoscopy;;    COLONOSCOPY, WITH POLYPECTOMY USING SNARE N/A 2023    Procedure: COLONOSCOPY, WITH POLYPECTOMY USING SNARE;  Surgeon: Placido Mckeon MD;  Location: [facility] ENDOSCOPY;  Service: Endoscopy;  Laterality: N/A;    CORONARY ARTERY BYPASS GRAFT  2021    CORONARY STENT PLACEMENT      PLACEMENT, MEDIPORT N/A 2023    Procedure: Placement, Mediport;  Surgeon: Joe Hendrix Jr., MD;  Location: [facility] OR;  Service: General;  Laterality: N/A;       Family History:  No family history on file.    Social History:  Social History     Tobacco Use    Smoking status: Former     Types: Cigarettes     Quit date:      Years since quittin.5   Substance Use Topics    Alcohol use: Not Currently     Alcohol/week: 35.0 standard drinks     Types: 25 Cans of beer, 10 Shots of liquor per week    Drug use: Not Currently     Types: Marijuana     Comment: weekends       Allergies:  Review of patient's allergies indicates:  No Known Allergies    Home Medications:  Prior to Admission medications    Medication Sig Start Date End Date Taking? Authorizing Provider   amLODIPine (NORVASC) 5 MG tablet Take 5 mg by mouth once daily. 22   Historical Provider   hydroCHLOROthiazide (HYDRODIURIL) 25 MG tablet Take 25 mg by mouth. 23   Historical Provider   HYDROcodone-acetaminophen (NORCO)  mg per tablet Take 1 tablet by mouth every 4 (four) hours as needed for Pain. 23   Jamaal Guevara MD   losartan (COZAAR) 100 MG tablet Take 100 mg by mouth every morning. 3/27/23   Historical Provider   megestroL (MEGACE) 400 mg/10 mL (40 mg/mL) Susp Take 20 mLs (800 mg total) by mouth once daily. 23  Jamaal Guevara  "MD   metoprolol succinate (TOPROL-XL) 25 MG 24 hr tablet Take 50 mg by mouth nightly. 22   Historical Provider   omeprazole (PRILOSEC) 40 MG capsule Take 40 mg by mouth. 22   Historical Provider   ondansetron (ZOFRAN) 4 MG tablet Take 1 tablet (4 mg total) by mouth every 6 (six) hours as needed for Nausea. 23  Jamaal Guevara MD   polyethylene glycol (MOVIPREP) 100-7.5-2.691 gram solution Take a directed per instructions provided by GI Clinic. 23   RENE Segovia         Review of Systems:  14 point ROS negative unless noted in HPI         Objective:   Last 24 Hour Vital Signs:  BP  Min: 78/52  Max: 135/79  Temp  Av.7 °F (37.1 °C)  Min: 98.7 °F (37.1 °C)  Max: 98.7 °F (37.1 °C)  Pulse  Av.7  Min: 75  Max: 104  Resp  Av.9  Min: 12  Max: 20  SpO2  Av.3 %  Min: 98 %  Max: 100 %  Height  Av' 1" (185.4 cm)  Min: 6' (182.9 cm)  Max: 6' 2" (188 cm)  Weight  Av.4 kg (210 lb 4.4 oz)  Min: 94.6 kg (208 lb 8.9 oz)  Max: 96.2 kg (212 lb)  Body mass index is 26.78 kg/m².  No intake/output data recorded.    Physical Examination:  Physical Exam  Constitutional:       General: He is not in acute distress.     Appearance: He is obese. He is ill-appearing. He is not toxic-appearing or diaphoretic.   HENT:      Head: Normocephalic and atraumatic.      Mouth/Throat:      Mouth: Mucous membranes are dry.      Pharynx: Oropharynx is clear.   Eyes:      Extraocular Movements: Extraocular movements intact.      Pupils: Pupils are equal, round, and reactive to light.      Comments: Pale sclera, conjunctiva   Cardiovascular:      Pulses: Normal pulses.      Heart sounds: No murmur heard.    No friction rub. No gallop.   Pulmonary:      Effort: Pulmonary effort is normal. No respiratory distress.      Breath sounds: Normal breath sounds. No wheezing, rhonchi or rales.   Abdominal:      General: Abdomen is flat. Bowel sounds are normal. There is no distension.      Palpations: " Abdomen is soft.      Tenderness: There is abdominal tenderness (diffuse generalized TTP).      Comments: Clots noted on RADHA   Skin:     General: Skin is warm and dry.      Capillary Refill: Capillary refill takes less than 2 seconds.   Neurological:      General: No focal deficit present.      Mental Status: He is alert and oriented to person, place, and time. Mental status is at baseline.      Cranial Nerves: No cranial nerve deficit.      Motor: No weakness.         Laboratory:  Most Recent Data:  CBC:   Lab Results   Component Value Date    WBC 38.79 (H) 07/03/2023    HGB 7.0 (L) 07/03/2023    HCT 22.0 (L) 07/03/2023     (H) 07/03/2023    MCV 78.3 (L) 07/03/2023    RDW 15.8 07/03/2023     WBC Differential:   Recent Labs   Lab 07/03/23  1604   WBC 38.79*   HGB 7.0*   HCT 22.0*   *   MCV 78.3*     BMP:   Lab Results   Component Value Date     (L) 07/03/2023    K 3.3 (L) 07/03/2023    CO2 20 (L) 07/03/2023    BUN 36.4 (H) 07/03/2023    CREATININE 2.57 (H) 07/03/2023    CALCIUM 9.2 07/03/2023    MG 2.20 05/26/2023     LFTs:   Lab Results   Component Value Date    ALBUMIN 1.6 (L) 07/03/2023    BILITOT 2.0 (H) 07/03/2023    AST 74 (H) 07/03/2023    ALKPHOS 714 (H) 07/03/2023    ALT 27 07/03/2023     Coags:   Lab Results   Component Value Date    INR 1.12 06/08/2023    PROTIME 14.2 06/08/2023    PTT 33.7 04/24/2023     FLP:   Lab Results   Component Value Date    CHOL 211 (H) 04/07/2021    HDL 57 04/07/2021    LDLCALC 103 04/07/2021    TRIG 256 (H) 04/07/2021     DM:   Lab Results   Component Value Date    LDLCALC 103 04/07/2021    CREATININE 2.57 (H) 07/03/2023     Thyroid: No results found for: TSH, FREET4, S3LIRJZ, L0EFPQQ, THYROIDAB  Anemia: No results found for: IRON, TIBC, FERRITIN, MCUYEQOR32, FOLATE  Cardiac:   Lab Results   Component Value Date    TROPONINI <0.010 07/03/2023     Urinalysis:   Lab Results   Component Value Date    PHUA 5.5 04/24/2023    UROBILINOGEN Normal 04/24/2023     WBCUA 0-2 04/24/2023       Trended Lab Data:  Recent Labs   Lab 07/03/23  1604   WBC 38.79*   HGB 7.0*   HCT 22.0*   *   MCV 78.3*   RDW 15.8   *   K 3.3*   CO2 20*   BUN 36.4*   CREATININE 2.57*   ALBUMIN 1.6*   BILITOT 2.0*   AST 74*   ALKPHOS 714*   ALT 27       Trended Cardiac Data:  Recent Labs   Lab 07/03/23  1604   TROPONINI <0.010       Microbiology Data:  Blood cultures pending    Other Results:  EKG (my interpretation): .    Radiology:  Imaging Results              X-Ray Chest AP Portable (Final result)  Result time 07/03/23 16:28:43      Final result by Yady Guillermo MD (07/03/23 16:28:43)                   Impression:      No acute abnormality of the chest.      Electronically signed by: Yady Guillermo  Date:    07/03/2023  Time:    16:28               Narrative:    EXAMINATION:  XR CHEST AP PORTABLE    CLINICAL HISTORY:  Sepsis;    COMPARISON:  Chest x-ray dated 06/05/2023    FINDINGS:  Right-sided chest wall port catheter has its tip over the superior vena cava.  The heart is normal in size.  The lungs are clear.  There is no pleural effusion or visible pneumothorax.                                           Assessment & Plan:     Stage IV Metastatic Colon Cancer       - Advanced metastatic colon cancer       - Mets noted to liver, abdomen/pelvis       - Pt not a candidate for chemo/radiation/surgical resection; agreed to hospice dispo with oncologist       - CM consulted for home hospice placement    Anemia  Likely Lower GI bleed 2/2 above       - H&H 7/22 down from 11.4/34.1 six weeks prior       - Will transfuse 2 units pRBC for symptomatic anemia       - No need for GI services given palliative treatment     SIRS 2/4       - WBC 38K with left shift, HR > 90       - Likely reactive, no identifiable source noted on CXR, UA, or suspected with hx       - Blood cultures pending, UA pending       - Will hold off on starting Abx for now given hospice plans; suspect reactive to cancer  and anemia      CODE STATUS: Full Code, will reassess with hospice  Access: PIV  Antibiotics: None  Diet: NPO  DVT Prophylaxis: SCD  GI Prophylaxis: Protonix  Fluids: LR      Disposition: Admit to obs for stabilization prior to discharging on home hospice once set up.      Dixon Baca MD  [facility] Internal Medicine HO-III    AM addendum:    Nursing staff called, notified GNR noted in 4/4 bottles. Starting Zosyn for broad gram negative coverage, will de-escalate if sensitivities allow.     Dixon Baca MD  [facility] IM PGY III  7/4/23 @ 0705

## 2023-07-04 LAB
ABO + RH BLD: NORMAL
ABO + RH BLD: NORMAL
ACINETOBACTER CALCOACETICUS-BAUMANNII COMPLEX (OHS): NOT DETECTED
ALBUMIN SERPL-MCNC: 1.4 G/DL (ref 3.4–4.8)
ALBUMIN/GLOB SERPL: 0.3 RATIO (ref 1.1–2)
ALP SERPL-CCNC: 640 UNIT/L (ref 40–150)
ALT SERPL-CCNC: 23 UNIT/L (ref 0–55)
APPEARANCE UR: ABNORMAL
AST SERPL-CCNC: 49 UNIT/L (ref 5–34)
BACTERIA #/AREA URNS AUTO: ABNORMAL /HPF
BACTEROIDES FRAGILIS (OHS): NOT DETECTED
BASOPHILS # BLD AUTO: 0.06 X10(3)/MCL
BASOPHILS NFR BLD AUTO: 0.2 %
BILIRUB UR QL STRIP.AUTO: NEGATIVE MG/DL
BILIRUBIN DIRECT+TOT PNL SERPL-MCNC: 2.2 MG/DL
BLD PROD TYP BPU: NORMAL
BLD PROD TYP BPU: NORMAL
BLOOD UNIT EXPIRATION DATE: NORMAL
BLOOD UNIT EXPIRATION DATE: NORMAL
BLOOD UNIT TYPE CODE: 5100
BLOOD UNIT TYPE CODE: 5100
BUN SERPL-MCNC: 35.7 MG/DL (ref 8.4–25.7)
C AURIS DNA BLD POS QL NAA+NON-PROBE: NOT DETECTED
C GATTII+NEOFOR DNA CSF QL NAA+NON-PROBE: NOT DETECTED
CALCIUM SERPL-MCNC: 8.5 MG/DL (ref 8.8–10)
CANDIDA ALBICANS (OHS): NOT DETECTED
CANDIDA GLABRATA (OHS): NOT DETECTED
CANDIDA KRUSEI (OHS): NOT DETECTED
CANDIDA PARAPSILOSIS (OHS): NOT DETECTED
CANDIDA TROPICALIS (OHS): NOT DETECTED
CHLORIDE SERPL-SCNC: 98 MMOL/L (ref 98–107)
CO2 SERPL-SCNC: 23 MMOL/L (ref 23–31)
COLOR UR: YELLOW
CREAT SERPL-MCNC: 2.43 MG/DL (ref 0.73–1.18)
CROSSMATCH INTERPRETATION: NORMAL
CROSSMATCH INTERPRETATION: NORMAL
CTX-M (OHS): NOT DETECTED
DISPENSE STATUS: NORMAL
DISPENSE STATUS: NORMAL
ENTEROBACTER CLOACAE COMPLEX (OHS): NOT DETECTED
ENTEROBACTERALES (OHS): DETECTED
ENTEROCOCCUS FAECALIS (OHS): NOT DETECTED
ENTEROCOCCUS FAECIUM (OHS): NOT DETECTED
EOSINOPHIL # BLD AUTO: 0.11 X10(3)/MCL (ref 0–0.9)
EOSINOPHIL NFR BLD AUTO: 0.4 %
ERYTHROCYTE [DISTWIDTH] IN BLOOD BY AUTOMATED COUNT: 15.5 % (ref 11.5–17)
ESCHERICHIA COLI (OHS): DETECTED
GFR SERPLBLD CREATININE-BSD FMLA CKD-EPI: 29 MLS/MIN/1.73/M2
GLOBULIN SER-MCNC: 4.8 GM/DL (ref 2.4–3.5)
GLUCOSE SERPL-MCNC: 124 MG/DL (ref 82–115)
GLUCOSE UR QL STRIP.AUTO: NORMAL MG/DL
GP B STREP DNA CSF QL NAA+NON-PROBE: NOT DETECTED
HAEM INFLU DNA CSF QL NAA+NON-PROBE: NOT DETECTED
HCT VFR BLD AUTO: 25.9 % (ref 42–52)
HGB BLD-MCNC: 8.5 G/DL (ref 14–18)
HYALINE CASTS #/AREA URNS LPF: ABNORMAL /LPF
IMM GRANULOCYTES # BLD AUTO: 0.95 X10(3)/MCL (ref 0–0.04)
IMM GRANULOCYTES NFR BLD AUTO: 3.5 %
IMP (OHS): NOT DETECTED
KETONES UR QL STRIP.AUTO: NEGATIVE MG/DL
KLEBSIELLA AEROGENES (OHS): NOT DETECTED
KLEBSIELLA OXYTOCA (OHS): NOT DETECTED
KLEBSIELLA PNEUMONIAE GROUP (OHS): NOT DETECTED
KPC (OHS): NOT DETECTED
L MONOCYTOG DNA CSF QL NAA+NON-PROBE: NOT DETECTED
LEUKOCYTE ESTERASE UR QL STRIP.AUTO: NEGATIVE UNIT/L
LYMPHOCYTES # BLD AUTO: 0.92 X10(3)/MCL (ref 0.6–4.6)
LYMPHOCYTES NFR BLD AUTO: 3.4 %
MAGNESIUM SERPL-MCNC: 2.3 MG/DL (ref 1.6–2.6)
MCH RBC QN AUTO: 26.1 PG (ref 27–31)
MCHC RBC AUTO-ENTMCNC: 32.8 G/DL (ref 33–36)
MCR-1 (OHS): NOT DETECTED
MCV RBC AUTO: 79.4 FL (ref 80–94)
MECA/C (OHS): ABNORMAL
MECA/C AND MREJ (MRSA)(OHS): ABNORMAL
MONOCYTES # BLD AUTO: 1.51 X10(3)/MCL (ref 0.1–1.3)
MONOCYTES NFR BLD AUTO: 5.6 %
MRSA PCR SCRN (OHS): NOT DETECTED
N MEN DNA CSF QL NAA+NON-PROBE: NOT DETECTED
NDM (OHS): NOT DETECTED
NEUTROPHILS # BLD AUTO: 23.48 X10(3)/MCL (ref 2.1–9.2)
NEUTROPHILS NFR BLD AUTO: 86.9 %
NITRITE UR QL STRIP.AUTO: NEGATIVE
NRBC BLD AUTO-RTO: 0 %
OXA-48-LIKE (OHS): NOT DETECTED
PH UR STRIP.AUTO: 5.5 [PH]
PHOSPHATE SERPL-MCNC: 2.2 MG/DL (ref 2.3–4.7)
PLATELET # BLD AUTO: 568 X10(3)/MCL (ref 130–400)
PMV BLD AUTO: 9.3 FL (ref 7.4–10.4)
POTASSIUM SERPL-SCNC: 4.9 MMOL/L (ref 3.5–5.1)
PROT SERPL-MCNC: 6.2 GM/DL (ref 5.8–7.6)
PROT UR QL STRIP.AUTO: ABNORMAL MG/DL
PROTEUS SPP. (OHS): NOT DETECTED
PSEUDOMONAS AERUGINOSA (OHS): NOT DETECTED
RBC # BLD AUTO: 3.26 X10(6)/MCL (ref 4.7–6.1)
RBC #/AREA URNS AUTO: ABNORMAL /HPF
RBC UR QL AUTO: NEGATIVE UNIT/L
S ENT+BONG DNA STL QL NAA+NON-PROBE: NOT DETECTED
S PNEUM DNA CSF QL NAA+NON-PROBE: NOT DETECTED
SERRATIA MARCESCENS (OHS): NOT DETECTED
SODIUM SERPL-SCNC: 132 MMOL/L (ref 136–145)
SP GR UR STRIP.AUTO: 1.01
SQUAMOUS #/AREA URNS LPF: ABNORMAL /HPF
STAPHYLOCOCCUS AUREUS (OHS): NOT DETECTED
STAPHYLOCOCCUS EPIDERMIDIS (OHS): NOT DETECTED
STAPHYLOCOCCUS LUGDUNENSIS (OHS): NOT DETECTED
STAPHYLOCOCCUS SPP. (OHS): NOT DETECTED
STENOTROPHOMONAS MALTOPHILIA (OHS): NOT DETECTED
STREPTOCOCCUS PYOGENES (GROUP A)(OHS): NOT DETECTED
STREPTOCOCCUS SPP. (OHS): NOT DETECTED
UNIDENT CRYS #/AREA URNS HPF: ABNORMAL /HPF
UNIT NUMBER: NORMAL
UNIT NUMBER: NORMAL
UROBILINOGEN UR STRIP-ACNC: ABNORMAL MG/DL
VANA/B (OHS): ABNORMAL
VIM (OHS): NOT DETECTED
WBC # SPEC AUTO: 27.03 X10(3)/MCL (ref 4.5–11.5)
WBC #/AREA URNS AUTO: ABNORMAL /HPF

## 2023-07-04 PROCEDURE — 80053 COMPREHEN METABOLIC PANEL: CPT | Performed by: STUDENT IN AN ORGANIZED HEALTH CARE EDUCATION/TRAINING PROGRAM

## 2023-07-04 PROCEDURE — P9016 RBC LEUKOCYTES REDUCED: HCPCS | Performed by: STUDENT IN AN ORGANIZED HEALTH CARE EDUCATION/TRAINING PROGRAM

## 2023-07-04 PROCEDURE — 21400001 HC TELEMETRY ROOM

## 2023-07-04 PROCEDURE — 63600175 PHARM REV CODE 636 W HCPCS: Performed by: STUDENT IN AN ORGANIZED HEALTH CARE EDUCATION/TRAINING PROGRAM

## 2023-07-04 PROCEDURE — G0378 HOSPITAL OBSERVATION PER HR: HCPCS

## 2023-07-04 PROCEDURE — 84100 ASSAY OF PHOSPHORUS: CPT | Performed by: STUDENT IN AN ORGANIZED HEALTH CARE EDUCATION/TRAINING PROGRAM

## 2023-07-04 PROCEDURE — 81001 URINALYSIS AUTO W/SCOPE: CPT | Performed by: INTERNAL MEDICINE

## 2023-07-04 PROCEDURE — 94761 N-INVAS EAR/PLS OXIMETRY MLT: CPT

## 2023-07-04 PROCEDURE — 85025 COMPLETE CBC W/AUTO DIFF WBC: CPT | Performed by: STUDENT IN AN ORGANIZED HEALTH CARE EDUCATION/TRAINING PROGRAM

## 2023-07-04 PROCEDURE — 25000003 PHARM REV CODE 250: Performed by: STUDENT IN AN ORGANIZED HEALTH CARE EDUCATION/TRAINING PROGRAM

## 2023-07-04 PROCEDURE — 97162 PT EVAL MOD COMPLEX 30 MIN: CPT

## 2023-07-04 PROCEDURE — 87641 MR-STAPH DNA AMP PROBE: CPT | Performed by: STUDENT IN AN ORGANIZED HEALTH CARE EDUCATION/TRAINING PROGRAM

## 2023-07-04 PROCEDURE — S0179 MEGESTROL 20 MG: HCPCS | Performed by: STUDENT IN AN ORGANIZED HEALTH CARE EDUCATION/TRAINING PROGRAM

## 2023-07-04 PROCEDURE — 83735 ASSAY OF MAGNESIUM: CPT | Performed by: STUDENT IN AN ORGANIZED HEALTH CARE EDUCATION/TRAINING PROGRAM

## 2023-07-04 RX ADMIN — PIPERACILLIN AND TAZOBACTAM 4.5 G: 4; .5 INJECTION, POWDER, LYOPHILIZED, FOR SOLUTION INTRAVENOUS; PARENTERAL at 04:07

## 2023-07-04 RX ADMIN — SODIUM CHLORIDE, POTASSIUM CHLORIDE, SODIUM LACTATE AND CALCIUM CHLORIDE: 600; 310; 30; 20 INJECTION, SOLUTION INTRAVENOUS at 05:07

## 2023-07-04 RX ADMIN — MORPHINE SULFATE 15 MG: 15 TABLET, EXTENDED RELEASE ORAL at 09:07

## 2023-07-04 RX ADMIN — POLYETHYLENE GLYCOL 3350 17 G: 17 POWDER, FOR SOLUTION ORAL at 08:07

## 2023-07-04 RX ADMIN — PIPERACILLIN AND TAZOBACTAM 4.5 G: 4; .5 INJECTION, POWDER, LYOPHILIZED, FOR SOLUTION INTRAVENOUS; PARENTERAL at 08:07

## 2023-07-04 RX ADMIN — MEGESTROL ACETATE 800 MG: 400 SUSPENSION ORAL at 08:07

## 2023-07-04 RX ADMIN — MORPHINE SULFATE 15 MG: 15 TABLET, EXTENDED RELEASE ORAL at 08:07

## 2023-07-04 RX ADMIN — PANTOPRAZOLE 40 MG: 40 TABLET, DELAYED RELEASE ORAL at 08:07

## 2023-07-04 RX ADMIN — METOPROLOL SUCCINATE 50 MG: 50 TABLET, EXTENDED RELEASE ORAL at 09:07

## 2023-07-04 RX ADMIN — SODIUM CHLORIDE, POTASSIUM CHLORIDE, SODIUM LACTATE AND CALCIUM CHLORIDE 500 ML: 600; 310; 30; 20 INJECTION, SOLUTION INTRAVENOUS at 09:07

## 2023-07-04 NOTE — PT/OT/SLP EVAL
Physical Therapy Evaluation    Patient Name:  Ashkan Mathews   MRN:  [id number]    Recommendations:     Discharge Recommendations:  home with hospice, home health PT (would benefit from therapy if possible)   Discharge Equipment Recommendations: shower chair , RW  Equipment to be obtained for discharge: rolling walker, shower chair.  Barriers to discharge: fall risk    Assessment:     Ashkan Mathews is a 62 y.o. male admitted with a medical diagnosis of Adenocarcinoma of rectum, stage 4.  He presents with the following impairments/functional limitations:  weakness, impaired endurance, impaired functional mobility, gait instability, impaired balance, pain, decreased lower extremity function.  1. Severe dehydration    2. Hypotension    3. RAMANDEEP (acute kidney injury)    4. Anemia, unspecified type    5. Hypo-osmolar hyponatremia    6. Hypokalemia    7. Metastatic malignant neoplasm, unspecified site    8. Chest pain        Rehab Prognosis: Fair.    Patient would benefit from continued skilled acute PT services to: address above listed impairments/functional limitations; receive patient/caregiver education; reduce fall risk; and maximize independency/safety with functional mobility.    Recent Surgery: * No surgery found *      Plan:     During this hospitalization, patient to be seen 3 x/week to address the identified impairments/functional limitations via gait training, therapeutic activities, therapeutic exercises and progress toward the established goals.    Plan of Care Expires:  08/03/23    Subjective     Communicated with patient's nurse Yocasta prior to session.    Patient agreeable to participate in evaluation.     Chief Complaint: pain with ambulation  Patient/Family Comments/goals: get stronger  Pain/Comfort:  Pain Rating 1: 0/10  Location - Side 1: Right  Location 1: thigh  Pain Rating Post-Intervention 1: 10/10 (burning sensation)  Pain Addressed 2: Nurse notified    Patients cultural, spiritual,  Islam conflicts given the current situation: no    Social History  Living Environment: Patient  lives with his son in his home  in a mobile home, with with bilat handrails, with tub and walk-in shower.  Functional Level: Prior to admission patient ambulated without assistive device and was independent in ADL's.  Equipment Used at Home: none  Equipment owned (not currently used): none.  Assistance Upon Discharge:  son, possible limited availability due to work .    Objective:     Patient found right sidelying with peripheral IV, telemetry  upon PT entry to room.    General Precautions: Standard, fall   Orthopedic Precautions:N/A   Braces: N/A  Respiratory Status: room air    Vitals   At Rest (pre-session)  BP  90/52   HR  94   O2 Sat %  95%     Exams:  Orientation: Patient is oriented to Person, Place, Time, Situation  Commands: Patient follows commands consistently  Fine Motor Coordination:     -     Intact:   Gross Motor Coordination:  WFL  RUE ROM: WFL  RUE Strength: WFL  LUE ROM: WFL  LUE Strength: WFL  RLE ROM: WFL  RLE Strength: WFL  LLE ROM: WFL  LLE Strength: WFL    Functional Mobility:    Bed Mobility:  Supine to Sit: contact guard assistance    Transfers:  Sit to Stand: minimum assistance with rolling walker    Gait:  Patient ambulated 25 ft x 2 with rolling walker and minimum assistance. Pt. Felt weak and had to sit. Then pt. Return to his bed.  Patient demonstrates occasional unsteady gait, decreased angelica, decreased step length, narrow base of support, and decreased foot/floor clearance.    Other Mobility:  not assessed    Balance:  Sit  Static: NORMAL: No deviations seen in posture held statically  Dynamic: GOOD: Maintains balance through MODERATE excursions of active trunk movement  Stand  Static: FAIR+: Takes MINIMAL challenges from all directions  Dynamic: FAIR: Needs CONTACT GUARD during gait    Patient left supine in bed with all lines intact, call button in reach, and nurse  notified.    Education     Patient was instructed to utilize staff assistance for mobility/transfers.  White board updated regarding patient's safest level of mobility with staff assistance.    Goals     Multidisciplinary Problems       Physical Therapy Goals          Problem: Physical Therapy    Goal Priority Disciplines Outcome Goal Variances Interventions   Physical Therapy Goal     PT, PT/OT Ongoing, Progressing     Description: Goals to be met by: dc     Patient will increase functional independence with mobility by performin. Supine to sit with Modified [place]  2. Sit to stand transfer with Modified [place]    3. Gait  x 130 feet with Supervision using Rolling Walker.                        History:     Past Medical History:   Diagnosis Date    CAD (coronary artery disease)     Cancer     HLD (hyperlipidemia)     HTN (hypertension)     MI (myocardial infarction)     Obesity, unspecified      Past Surgical History:   Procedure Laterality Date    COLONOSCOPY, WITH 1 OR MORE BIOPSIES  2023    Procedure: COLONOSCOPY, WITH 1 OR MORE BIOPSIES;  Surgeon: Placido Mckeon MD;  Location: [facility] ENDOSCOPY;  Service: Endoscopy;;    COLONOSCOPY, WITH POLYPECTOMY USING SNARE N/A 2023    Procedure: COLONOSCOPY, WITH POLYPECTOMY USING SNARE;  Surgeon: Placido Mckeon MD;  Location: [facility] ENDOSCOPY;  Service: Endoscopy;  Laterality: N/A;    CORONARY ARTERY BYPASS GRAFT  2021    CORONARY STENT PLACEMENT      PLACEMENT, MEDIPORT N/A 2023    Procedure: Placement, Mediport;  Surgeon: Joe Hendrix Jr., MD;  Location: [facility] OR;  Service: General;  Laterality: N/A;     Time Tracking:     PT Received On: 23  PT Start Time: 0800     PT Stop Time: 0835  PT Total Time (min): 35 min     Billable Minutes: Evaluation 35    2023

## 2023-07-04 NOTE — PLAN OF CARE
Problem: Physical Therapy  Goal: Physical Therapy Goal  Description: Goals to be met by: dc     Patient will increase functional independence with mobility by performin. Supine to sit with Modified [place]  2. Sit to stand transfer with Modified [place]    3. Gait  x 130 feet with Supervision using Rolling Walker.     Outcome: Ongoing, Progressing

## 2023-07-04 NOTE — CONSULTS
Provided Advanced Directives educational materials to patient; discussed hospice options with patient/family and they would like time to discuss; when a decision is made, they will contact me; provided mobile number.

## 2023-07-04 NOTE — CONSULTS
Referral sent to [facility] via [facility]; also called and spoke to JOSE Schilling on call, who stated she will speak to her manager to determine if the  needs to come out today; however, she stated she will come to provide information to the patient; I requested a time frame since the family is waiting at bedside and she stated she will call me back.

## 2023-07-05 PROBLEM — E43 SEVERE MALNUTRITION: Status: ACTIVE | Noted: 2023-07-05

## 2023-07-05 LAB
ALBUMIN SERPL-MCNC: 1.2 G/DL (ref 3.4–4.8)
ALBUMIN/GLOB SERPL: 0.3 RATIO (ref 1.1–2)
ALP SERPL-CCNC: 674 UNIT/L (ref 40–150)
ALT SERPL-CCNC: 18 UNIT/L (ref 0–55)
AST SERPL-CCNC: 36 UNIT/L (ref 5–34)
BASOPHILS # BLD AUTO: 0.07 X10(3)/MCL
BASOPHILS NFR BLD AUTO: 0.3 %
BILIRUBIN DIRECT+TOT PNL SERPL-MCNC: 2.2 MG/DL
BUN SERPL-MCNC: 36.9 MG/DL (ref 8.4–25.7)
CALCIUM SERPL-MCNC: 8.4 MG/DL (ref 8.8–10)
CHLORIDE SERPL-SCNC: 101 MMOL/L (ref 98–107)
CO2 SERPL-SCNC: 20 MMOL/L (ref 23–31)
CREAT SERPL-MCNC: 2.27 MG/DL (ref 0.73–1.18)
EOSINOPHIL # BLD AUTO: 0.2 X10(3)/MCL (ref 0–0.9)
EOSINOPHIL NFR BLD AUTO: 0.7 %
ERYTHROCYTE [DISTWIDTH] IN BLOOD BY AUTOMATED COUNT: 16.1 % (ref 11.5–17)
GFR SERPLBLD CREATININE-BSD FMLA CKD-EPI: 32 MLS/MIN/1.73/M2
GLOBULIN SER-MCNC: 4.6 GM/DL (ref 2.4–3.5)
GLUCOSE SERPL-MCNC: 114 MG/DL (ref 82–115)
HCT VFR BLD AUTO: 23.9 % (ref 42–52)
HGB BLD-MCNC: 7.9 G/DL (ref 14–18)
IMM GRANULOCYTES # BLD AUTO: 0.88 X10(3)/MCL (ref 0–0.04)
IMM GRANULOCYTES NFR BLD AUTO: 3.2 %
LYMPHOCYTES # BLD AUTO: 1.05 X10(3)/MCL (ref 0.6–4.6)
LYMPHOCYTES NFR BLD AUTO: 3.8 %
MAGNESIUM SERPL-MCNC: 2.3 MG/DL (ref 1.6–2.6)
MCH RBC QN AUTO: 26.4 PG (ref 27–31)
MCHC RBC AUTO-ENTMCNC: 33.1 G/DL (ref 33–36)
MCV RBC AUTO: 79.9 FL (ref 80–94)
MONOCYTES # BLD AUTO: 2.1 X10(3)/MCL (ref 0.1–1.3)
MONOCYTES NFR BLD AUTO: 7.6 %
NEUTROPHILS # BLD AUTO: 23.45 X10(3)/MCL (ref 2.1–9.2)
NEUTROPHILS NFR BLD AUTO: 84.4 %
NRBC BLD AUTO-RTO: 0 %
PHOSPHATE SERPL-MCNC: 2.6 MG/DL (ref 2.3–4.7)
PLATELET # BLD AUTO: 552 X10(3)/MCL (ref 130–400)
PMV BLD AUTO: 9.4 FL (ref 7.4–10.4)
POTASSIUM SERPL-SCNC: 4.4 MMOL/L (ref 3.5–5.1)
PROT SERPL-MCNC: 5.8 GM/DL (ref 5.8–7.6)
RBC # BLD AUTO: 2.99 X10(6)/MCL (ref 4.7–6.1)
SODIUM SERPL-SCNC: 133 MMOL/L (ref 136–145)
WBC # SPEC AUTO: 27.75 X10(3)/MCL (ref 4.5–11.5)

## 2023-07-05 PROCEDURE — 84100 ASSAY OF PHOSPHORUS: CPT | Performed by: STUDENT IN AN ORGANIZED HEALTH CARE EDUCATION/TRAINING PROGRAM

## 2023-07-05 PROCEDURE — 80053 COMPREHEN METABOLIC PANEL: CPT | Performed by: STUDENT IN AN ORGANIZED HEALTH CARE EDUCATION/TRAINING PROGRAM

## 2023-07-05 PROCEDURE — 85025 COMPLETE CBC W/AUTO DIFF WBC: CPT | Performed by: STUDENT IN AN ORGANIZED HEALTH CARE EDUCATION/TRAINING PROGRAM

## 2023-07-05 PROCEDURE — 21400001 HC TELEMETRY ROOM

## 2023-07-05 PROCEDURE — 97116 GAIT TRAINING THERAPY: CPT

## 2023-07-05 PROCEDURE — 25000003 PHARM REV CODE 250: Performed by: INTERNAL MEDICINE

## 2023-07-05 PROCEDURE — 94761 N-INVAS EAR/PLS OXIMETRY MLT: CPT

## 2023-07-05 PROCEDURE — 97530 THERAPEUTIC ACTIVITIES: CPT

## 2023-07-05 PROCEDURE — 63600175 PHARM REV CODE 636 W HCPCS: Performed by: STUDENT IN AN ORGANIZED HEALTH CARE EDUCATION/TRAINING PROGRAM

## 2023-07-05 PROCEDURE — S0179 MEGESTROL 20 MG: HCPCS | Performed by: STUDENT IN AN ORGANIZED HEALTH CARE EDUCATION/TRAINING PROGRAM

## 2023-07-05 PROCEDURE — 25000003 PHARM REV CODE 250: Performed by: STUDENT IN AN ORGANIZED HEALTH CARE EDUCATION/TRAINING PROGRAM

## 2023-07-05 PROCEDURE — 83735 ASSAY OF MAGNESIUM: CPT | Performed by: STUDENT IN AN ORGANIZED HEALTH CARE EDUCATION/TRAINING PROGRAM

## 2023-07-05 RX ORDER — METOPROLOL TARTRATE 25 MG/1
25 TABLET, FILM COATED ORAL 2 TIMES DAILY
Status: DISCONTINUED | OUTPATIENT
Start: 2023-07-05 | End: 2023-07-06

## 2023-07-05 RX ORDER — HEPARIN SODIUM 5000 [USP'U]/ML
5000 INJECTION, SOLUTION INTRAVENOUS; SUBCUTANEOUS EVERY 12 HOURS
Status: DISCONTINUED | OUTPATIENT
Start: 2023-07-05 | End: 2023-07-10 | Stop reason: HOSPADM

## 2023-07-05 RX ORDER — MUPIROCIN 20 MG/G
OINTMENT TOPICAL 2 TIMES DAILY
Status: DISPENSED | OUTPATIENT
Start: 2023-07-05 | End: 2023-07-10

## 2023-07-05 RX ADMIN — METOPROLOL TARTRATE 25 MG: 25 TABLET, FILM COATED ORAL at 09:07

## 2023-07-05 RX ADMIN — MUPIROCIN: 20 OINTMENT TOPICAL at 08:07

## 2023-07-05 RX ADMIN — HEPARIN SODIUM 5000 UNITS: 5000 INJECTION, SOLUTION INTRAVENOUS; SUBCUTANEOUS at 08:07

## 2023-07-05 RX ADMIN — MORPHINE SULFATE 15 MG: 15 TABLET, EXTENDED RELEASE ORAL at 09:07

## 2023-07-05 RX ADMIN — MEGESTROL ACETATE 800 MG: 400 SUSPENSION ORAL at 09:07

## 2023-07-05 RX ADMIN — METOPROLOL TARTRATE 25 MG: 25 TABLET, FILM COATED ORAL at 08:07

## 2023-07-05 RX ADMIN — PIPERACILLIN AND TAZOBACTAM 4.5 G: 4; .5 INJECTION, POWDER, LYOPHILIZED, FOR SOLUTION INTRAVENOUS; PARENTERAL at 09:07

## 2023-07-05 RX ADMIN — MORPHINE SULFATE 15 MG: 15 TABLET, EXTENDED RELEASE ORAL at 08:07

## 2023-07-05 RX ADMIN — SODIUM CHLORIDE, POTASSIUM CHLORIDE, SODIUM LACTATE AND CALCIUM CHLORIDE 500 ML: 600; 310; 30; 20 INJECTION, SOLUTION INTRAVENOUS at 10:07

## 2023-07-05 RX ADMIN — PIPERACILLIN AND TAZOBACTAM 4.5 G: 4; .5 INJECTION, POWDER, LYOPHILIZED, FOR SOLUTION INTRAVENOUS; PARENTERAL at 12:07

## 2023-07-05 RX ADMIN — POLYETHYLENE GLYCOL 3350 17 G: 17 POWDER, FOR SOLUTION ORAL at 09:07

## 2023-07-05 RX ADMIN — PIPERACILLIN AND TAZOBACTAM 4.5 G: 4; .5 INJECTION, POWDER, LYOPHILIZED, FOR SOLUTION INTRAVENOUS; PARENTERAL at 03:07

## 2023-07-05 RX ADMIN — MUPIROCIN: 20 OINTMENT TOPICAL at 10:07

## 2023-07-05 RX ADMIN — PANTOPRAZOLE 40 MG: 40 TABLET, DELAYED RELEASE ORAL at 09:07

## 2023-07-05 NOTE — ASSESSMENT & PLAN NOTE
"Patient meets ASPEN criteria for severe malnutrition of chronic illness per RD assessment as evidenced by:  Energy Intake (Malnutrition): less than 75% for greater than or equal to 1 month  Weight Loss (Malnutrition): greater than 7.5% in 3 months                 A minimum of two characteristics is recommended for diagnosis of either severe or non-severe malnutrition.    Ht Readings from Last 1 Encounters:   07/03/23 6' 2" (1.88 m)     Wt Readings from Last 1 Encounters:   07/03/23 2028 96.6 kg (212 lb 15.4 oz)   07/03/23 1548 94.6 kg (208 lb 8.9 oz)     Body mass index is 27.34 kg/m².    Patient has been screened and assessed by RD. See nutrition recommendations documented in inpatient nutrition assessment. RD will continue to follow patient throughout hospitalization.  "

## 2023-07-05 NOTE — PT/OT/SLP PROGRESS
Physical Therapy    Missed Treatment Session    Patient Name:  Ashkan Mathews   MRN:  [id number]      Patient not seen at this time secondary to Other (Comment). Pt. Is talking to Hospice.    Will follow-up as patient is available to participate and as therapists' schedule allows.

## 2023-07-05 NOTE — PROGRESS NOTES
"[facility] Medicine Wards Progress Note    Resident Team: [facility] Medicine List 3  Attending Physician: Bushra Mccarty MD  Resident: Phuong  Intern: Nehemias     Date of Admit: 7/3/2023    Chief Complaint     Abdominal Pain (Pt reports dx of colo-rectal cancer, Just left Dr. Guevara's office and was told to come down to ER for admit to hospital for pain control. )    Subjective:      History of Present Illness:  Ashkan Mathews is a 62 y.o. male with PMH significant for CAD, HLD, HTN and advanced metastatic (stage IV) colo-rectal cancer who was sent down from oncology clinic for admission for hospice. Patient was recently diagnosed with adenocarcinoma of the colon on c-scope 5/4/23 with mets to liver, abdomen, and pelvis on imaging. He presented for initial appointment to discuss treatment options, but was tachycardic and hypotensive so after discussion with his oncologist, was sent to ED to be admitted and set up with hospice. Patient currently complaining of bloody BM's x2 weeks associated with fatigue and SOB. Endorses passing clots in his bowel movements. Denies fever, chills, increased cough/sputum, dysuria. Pt would like to "gain his strength" back and go home with hospice.    In the ED, labs significant for leukocytosis with left shift WBC 38.8, H&H 7/22, MCV 78.3, platelets 837. Pt hyponatremic Na 128, K 3.3; RAMANDEEP with BUN/Cr 36.4/2.57, hypoalbuminemia 1.6. Trop negative, lactate 5. Blood cultures obtained in ED< no consolidation on CXR, port-catheter with tip over SVC in place. Patient initially hypotensive with bP 78/52,  on admission, afebrile satting 100% on room air. Hypotension resolved with IVF resuscitation. Medicine consulted for admission to stabilize patient and assist with hospice coordination.    Interval Hx:  NAEO. Patient's daughter reported that patient might have had bloody bowel movement which was "dark, tarry" looking, denied bright red blood. Patient feels more fatigued today. " Denies any sob,chest pain, palpitations, dysuria, hematuria.  Pt continues to want to be a full code and accepted to palliative with [facility].        Review of Systems:  14 point ROS negative unless noted in HPI         Objective:   Last 24 Hour Vital Signs:  BP  Min: 95/59  Max: 112/69  Temp  Av.2 °F (36.8 °C)  Min: 98 °F (36.7 °C)  Max: 98.5 °F (36.9 °C)  Pulse  Av.4  Min: 82  Max: 93  Resp  Av.8  Min: 18  Max: 20  SpO2  Av.9 %  Min: 96 %  Max: 98 %  Body mass index is 27.34 kg/m².  I/O last 3 completed shifts:  In: 1608.3 [P.O.:1000; Blood:608.3]  Out: 2000 [Urine:2000]    Physical Examination:  Physical Exam  Constitutional:       General: He is not in acute distress.     Appearance: He is obese. He is ill-appearing. He is not toxic-appearing or diaphoretic.   HENT:      Head: Normocephalic and atraumatic.      Mouth/Throat:      Mouth: Mucous membranes are dry.      Pharynx: Oropharynx is clear.   Eyes:      Extraocular Movements: Extraocular movements intact.      Pupils: Pupils are equal, round, and reactive to light.      Comments: Pale sclera, conjunctiva   Cardiovascular:      Pulses: Normal pulses.      Heart sounds: No murmur heard.    No friction rub. No gallop.   Pulmonary:      Effort: Pulmonary effort is normal. No respiratory distress.      Breath sounds: Normal breath sounds. No wheezing, rhonchi or rales.   Abdominal:      General: Abdomen is flat. Bowel sounds are normal. There is no distension.      Palpations: Abdomen is soft.      Tenderness: Tenderness: diffuse generalized TTP.   Musculoskeletal:      Right lower leg: No edema.      Left lower leg: No edema.   Skin:     General: Skin is warm and dry.      Capillary Refill: Capillary refill takes less than 2 seconds.   Neurological:      General: No focal deficit present.      Mental Status: He is alert and oriented to person, place, and time. Mental status is at baseline.      Cranial Nerves: No cranial nerve  deficit.      Motor: No weakness.         Laboratory:  Most Recent Data:  CBC:   Lab Results   Component Value Date    WBC 27.75 (H) 07/05/2023    HGB 7.9 (L) 07/05/2023    HCT 23.9 (L) 07/05/2023     (H) 07/05/2023    MCV 79.9 (L) 07/05/2023    RDW 16.1 07/05/2023     WBC Differential:   Recent Labs   Lab 07/03/23  1604 07/04/23  0713 07/05/23  0431   WBC 38.79* 27.03* 27.75*   HGB 7.0* 8.5* 7.9*   HCT 22.0* 25.9* 23.9*   * 568* 552*   MCV 78.3* 79.4* 79.9*       BMP:   Lab Results   Component Value Date     (L) 07/05/2023    K 4.4 07/05/2023    CO2 20 (L) 07/05/2023    BUN 36.9 (H) 07/05/2023    CREATININE 2.27 (H) 07/05/2023    CALCIUM 8.4 (L) 07/05/2023    MG 2.30 07/05/2023    PHOS 2.6 07/05/2023     LFTs:   Lab Results   Component Value Date    ALBUMIN 1.2 (L) 07/05/2023    BILITOT 2.2 (H) 07/05/2023    AST 36 (H) 07/05/2023    ALKPHOS 674 (H) 07/05/2023    ALT 18 07/05/2023     Coags:   Lab Results   Component Value Date    INR 1.12 06/08/2023    PROTIME 14.2 06/08/2023    PTT 33.7 04/24/2023     FLP:   Lab Results   Component Value Date    CHOL 211 (H) 04/07/2021    HDL 57 04/07/2021    LDLCALC 103 04/07/2021    TRIG 256 (H) 04/07/2021     DM:   Lab Results   Component Value Date    LDLCALC 103 04/07/2021    CREATININE 2.27 (H) 07/05/2023     Thyroid: No results found for: TSH, FREET4, K5GDFVU, N3XCHMU, THYROIDAB  Anemia: No results found for: IRON, TIBC, FERRITIN, VERTPKZP85, FOLATE  Cardiac:   Lab Results   Component Value Date    TROPONINI <0.010 07/03/2023     Urinalysis:   Lab Results   Component Value Date    PHUA 5.5 07/04/2023    UROBILINOGEN 1+ (A) 07/04/2023    WBCUA 0-5 07/04/2023       Trended Lab Data:  Recent Labs   Lab 07/03/23  1604 07/04/23  0713 07/05/23  0431   WBC 38.79* 27.03* 27.75*   HGB 7.0* 8.5* 7.9*   HCT 22.0* 25.9* 23.9*   * 568* 552*   MCV 78.3* 79.4* 79.9*   RDW 15.8 15.5 16.1   * 132* 133*   K 3.3* 4.9 4.4   CO2 20* 23 20*   BUN 36.4* 35.7*  36.9*   CREATININE 2.57* 2.43* 2.27*   ALBUMIN 1.6* 1.4* 1.2*   BILITOT 2.0* 2.2* 2.2*   AST 74* 49* 36*   ALKPHOS 714* 640* 674*   ALT 27 23 18         Trended Cardiac Data:  Recent Labs   Lab 07/03/23  1604   TROPONINI <0.010         Microbiology Data:  Blood cultures 7/3/23 x2 sets positive E. Coli with biofire confirmation (no ESBL) resistant to cipro but sensitive to rocephin   Other Results:  EKG (my interpretation): n/a    Radiology:  Imaging Results              X-Ray Chest AP Portable (Final result)  Result time 07/03/23 16:28:43      Final result by Yady Guillermo MD (07/03/23 16:28:43)                   Impression:      No acute abnormality of the chest.      Electronically signed by: Yady Guillermo  Date:    07/03/2023  Time:    16:28               Narrative:    EXAMINATION:  XR CHEST AP PORTABLE    CLINICAL HISTORY:  Sepsis;    COMPARISON:  Chest x-ray dated 06/05/2023    FINDINGS:  Right-sided chest wall port catheter has its tip over the superior vena cava.  The heart is normal in size.  The lungs are clear.  There is no pleural effusion or visible pneumothorax.                                           Assessment & Plan:     Stage IV Metastatic Colon Cancer       - Advanced metastatic colon cancer       - Mets noted to liver, abdomen/pelvis       - Pt not a candidate for chemo/radiation/surgical resection; agreed to hospice dispo with oncologist       - Patient accepted to palliative with [facility].      Anemia  Likely Lower GI bleed 2/2 above       - H&H 7/22 down from 11.4/34.1 six weeks prior       - S/p 2 units pRBC, H&H of 7.5/23.1 Will continue to trend, transfuse for Hgb < 7       - No need for GI services given palliative treatment         - Given hypotensive episode this morning, will repeat H/H, lactic acid cmp and hold BB       - Will stop fluids given pt is more swollen this morning with CXR suggestive of vascular congestion          E. Coli Bacteremia  Sepsis        -  WBC 38K with left shift, HR > 90, +blood cultures x2 E. Coli, sensitive to rocephin; UA showed no signs of infection, likely source from GI tract given colon cancer       - Switched from  Zosyn  IV rocephin  Abx, Will need total of 14 days of IV abx given pt resistant to cipro (Last day 7/17/23)       -repeat blood cultures today at 48h jose a of previous       -Pt accepted to palliative with [facility] but does not have insurance to receive home health for IV abx.       - BUN/Cr worsening not suggestive of prerenal likely from being on zosyn; Assess volume status tomorrow to start fluids but will hold it now given pt is more volume overloaded          CODE STATUS: Full Code  Access: PIV  Antibiotics: None  Diet: regular diet   DVT Prophylaxis: SCD, will start heparin 5K BID DVT ppx given increased risk with malignancy  GI Prophylaxis: Protonix  Fluids: None       Disposition: Pt accepted to palliative with [facility] but does not have insurance to receive home health for IV abx for total of 14 days         Yoan Lehman   [employer] [facility] HO-2

## 2023-07-05 NOTE — PLAN OF CARE
07/05/23 1119   Discharge Assessment   Assessment Type Discharge Planning Assessment   Confirmed/corrected address, phone number and insurance Yes   Confirmed Demographics Correct on Facesheet   Source of Information patient;family   When was your last doctors appointment?   (PCP: Analilia Ulloa)   Does patient/caregiver understand observation status   (Inpatient)   Communicated SOLITARIO with patient/caregiver Date not available/Unable to determine   Reason For Admission Hypokalemia, severe dehydration, RAMANDEEP, CP, Hypotension, Anemia, Metastatic malignant neoplasm, Hypo-osmolar hyponatremia   People in Home child(freda), adult   Facility Arrived From: Home   Do you expect to return to your current living situation? Yes   Do you have help at home or someone to help you manage your care at home? Yes   Who are your caregiver(s) and their phone number(s)? ZHEN Calderón, P: [phone]; Crispin, aunt, P: [phone], Ashkan Mathews II, son, P: [phone]   Prior to hospitilization cognitive status: Alert/Oriented;No Deficits   Current cognitive status: Alert/Oriented;No Deficits   Walking or Climbing Stairs ambulation difficulty, requires equipment   Mobility Management Rolling walker   Home Accessibility not wheelchair accessible;stairs to enter home   Number of Stairs, Main Entrance four   Stair Railings, Main Entrance railings safe and in good condition;railings on both sides of stairs   Home Layout Able to live on 1st floor   Equipment Currently Used at Home walker, rolling  (Has rails on sides of toilet)   Readmission within 30 days? No   Patient currently being followed by outpatient case management? No   Do you currently have service(s) that help you manage your care at home? No   Do you take prescription medications? Yes   Do you have prescription coverage? No   Do you have any problems affording any of your prescribed medications? TBD   Is the patient taking medications as prescribed? yes   Who is going to help you  get home at discharge? Family   How do you get to doctors appointments? family or friend will provide   Are you on dialysis? No   Do you take coumadin? No   Discharge Plan A Home with family   Discharge Plan B Hospice/home   DME Needed Upon Discharge    (TBD)   Discharge Plan discussed with: Patient  (Aunt and ZHEN)   Transition of Care Barriers Unisured   Physical Activity   On average, how many days per week do you engage in moderate to strenuous exercise (like a brisk walk)? 0 days   On average, how many minutes do you engage in exercise at this level? 0 min   Financial Resource Strain   How hard is it for you to pay for the very basics like food, housing, medical care, and heating? Very Hard   Housing Stability   In the last 12 months, was there a time when you were not able to pay the mortgage or rent on time? N   In the last 12 months, how many places have you lived? 1   In the last 12 months, was there a time when you did not have a steady place to sleep or slept in a shelter (including now)? N   Transportation Needs   In the past 12 months, has lack of transportation kept you from medical appointments or from getting medications? no   In the past 12 months, has lack of transportation kept you from meetings, work, or from getting things needed for daily living? No   Food Insecurity   Within the past 12 months, you worried that your food would run out before you got the money to buy more. Never true   Within the past 12 months, the food you bought just didn't last and you didn't have money to get more. Never true   Stress   Do you feel stress - tense, restless, nervous, or anxious, or unable to sleep at night because your mind is troubled all the time - these days? Very much   Social Connections   In a typical week, how many times do you talk on the phone with family, friends, or neighbors? More than 3   How often do you get together with friends or relatives? More than 3   OTHER   Name(s) of People in Home  Ashkan Mathews II, son, P: [phone]

## 2023-07-05 NOTE — PLAN OF CARE
Spoke to Trey with [facility]. Patient has been accepted for palliative care.

## 2023-07-05 NOTE — PT/OT/SLP PROGRESS
Physical Therapy Treatment    Patient Name:  Ashkan Mathews   MRN:  [id number]    Recommendations     Discharge Recommendations:  home with hospice, home health PT (would benefit from therapy if possible)   Discharge Equipment Recommendations: bedside commode, shower chair, hospital bed, walker, rolling   Barriers to discharge: fall risk, level of skilled assistance required, decreased endurance, and impaired cognitive status    Assessment     Ashkan Mathews is a 62 y.o. male admitted with a medical diagnosis of Adenocarcinoma of rectum, stage 4.    He presents with the following impairments/functional limitations:  weakness, gait instability, impaired endurance, impaired self care skills, impaired functional mobility, impaired balance, pain.    Rehab Prognosis: Fair.    Patient would benefit from continued skilled acute PT services to: address above listed impairments/functional limitations; receive patient/caregiver education; reduce fall risk; and maximize independency/safety with functional mobility.    Recent Surgery: * No surgery found *      Plan     During this hospitalization, patient to be seen 3 x/week to address the identified impairments/functional limitations via gait training, therapeutic activities, therapeutic exercises and progress toward the established goals.    Plan of Care Expires:  08/03/23    Subjective     Communicated with patient's nurse Yocasta prior to session.    Patient agreeable to participate in treatment session.    Chief Complaint: pain rectum  Patient/Family Comments/goals: wants to go home  Pain/Comfort:  Pain Rating 1: 0/10  Location 1: perirectal  Pain Rating Post-Intervention 1: 8/10  Pain Addressed 2: Reposition, Cessation of Activity, Nurse notified    Objective     Patient found supine in bed with peripheral IV, telemetry  upon PT entry to room.    General Precautions: Standard, fall   Orthopedic Precautions:N/A   Braces:    Respiratory Status: room air    Functional  Mobility:    Bed Mobility:  Rolling Left: stand by assistance and verbal cues for handplacement  Rolling Right: stand by assistance and same as above  Supine to Sit: verbal cues and SBA  Sit to Supine:SBA  Transfers:  Sit to Stand: contact guard assistance with no assistive device  Pt. Performed bathroom transfers. Pt. Needed assist for clothing adjustment. Pt. Had an accident and soiled his underwear, didn't notice until he stood up. Pt. Has loose stool. While being in the bathroom pt. Was a little agitated with the IV lines and asked therapist what that was and if she could take it off. Therapist explained to pt. And he was ok. Pt. Seemed a little confused at times during tx session, but was answering questions when being asked.    Gait:  Patient ambulated 20 ft x 2 with no assistive device and minimum assistance x 1 person. Patient demonstrates unsteady gait, decreased angelica, decreased step length, decreased foot/floor clearance, inconsistent bilateral foot placement, flexed posture, and shuffle gait. Also noticed shortness of breath after pt. Used restroom and sat back down on the bed. SAT's were 95%. Pt. Was very tired after session and fell asleep soon after he layed down in his bed.   Other Mobility:  not assessed    Patient left supine in bed and with HOB elevated with all lines intact, call button in reach, Yocasta notified, and daughter in law present.    Goals     Multidisciplinary Problems       Physical Therapy Goals          Problem: Physical Therapy    Goal Priority Disciplines Outcome Goal Variances Interventions   Physical Therapy Goal     PT, PT/OT Ongoing, Progressing     Description: Goals to be met by: dc     Patient will increase functional independence with mobility by performin. Supine to sit with Modified [place]  2. Sit to stand transfer with Modified [place]    3. Gait  x 130 feet with Supervision using Rolling Walker.     Reviewed 2023                         Time  Tracking     PT Received On: 07/05/23  PT Start Time: 1405     PT Stop Time: 1442  PT Total Time (min):37    Billable Minutes: Gait Training 12 and Therapeutic Activity 25    07/05/2023

## 2023-07-05 NOTE — PROGRESS NOTES
Inpatient Nutrition Assessment    Admit Date: 7/3/2023   Total duration of encounter: 2 days     Nutrition Recommendation/Prescription     Suggest [place], Low Residue diet for best tolerance  Boost Plus (provides 360 kcal, 14 g protein per serving) TID  Medical management of abdominal pain  Continue Megace to stimulate appetite      Communication of Recommendations: reviewed with patient and reviewed with caregiver    Nutrition Assessment     Malnutrition Assessment/Nutrition-Focused Physical Exam    Malnutrition Context: chronic illness  Malnutrition Level: severe  Energy Intake (Malnutrition): less than 75% for greater than or equal to 1 month  Weight Loss (Malnutrition): greater than 7.5% in 3 months                                                  A minimum of two characteristics is recommended for diagnosis of either severe or non-severe malnutrition.    Chart Review    Reason Seen: continuous nutrition monitoring and malnutrition screening tool (MST)    Malnutrition Screening Tool Results   Have you recently lost weight without trying?: Unsure  Have you been eating poorly because of a decreased appetite?: Yes   MST Score: 3     Diagnosis:  Stage IV Metastatic colon cancer, Anemia, Likely lower GI Bleed, SIRS 2/4    Relevant Medical History: CAD, HLD, HTN, Advanced Metastatic colo-rectal cancer    Nutrition-Related Medications: LR @ 100 ml/hr, Megace, Protonix, Miralax  Calorie Containing IV Medications: no significant kcals from medications at this time    Nutrition-Related Labs:  7/5/23 -- H/H 7.9/23.9 L, Na 133 L, K 4.4, BUN 36 H, Cr 2.2, Phos 2.6    Diet/PN Order: Diet Adult Regular  Oral Supplement Order: none  Tube Feeding Order: none  Appetite/Oral Intake: fair/50-75% of meals  Factors Affecting Nutritional Intake: abdominal pain and decreased appetite  Food/Advent/Cultural Preferences: none reported  Food Allergies: none reported       Wound(s):   skin intact     Comments    7/5/23 -- Pt reports fair  "appetite > 1 month, reports eating ~50% of his breakfast this am, Megace ordered noted for appetite, pt reports prescribed PTA but not taking it regularly, encouraged compliance for best results; no n/v, LBM 7/4 (loose); Pt with significant wt loss over the last 3 months noted, willing to try Boost ONS in chocolate flavor; Consult to hospice noted, will monitor plan of care    Anthropometrics    Height: 6' 2" (188 cm) Height Method: Stated  Last Weight: 96.6 kg (212 lb 15.4 oz) (23) Weight Method: Bed Scale  BMI (Calculated): 27.3  BMI Classification: overweight (BMI 25-29.9)        Ideal Body Weight (IBW), Male: 190 lb     % Ideal Body Weight, Male (lb): 112.09 %                 Usual Body Weight (UBW), k kg  % Usual Body Weight: 91.32     Usual Weight Provided By: EMR weight history    Wt Readings from Last 5 Encounters:   23 96.6 kg (212 lb 15.4 oz)   23 96.2 kg (212 lb)   23 96.2 kg (212 lb 1.3 oz)   23 96.2 kg (212 lb 1.3 oz)   23 99.3 kg (219 lb)   23 100.7 kg  23 106.4 kg    Weight Change(s) Since Admission:  Admit Weight: 94.6 kg (208 lb 8.9 oz) (23 1548)  10% wt loss x 3 months, significant    Estimated Needs    Weight Used For Calorie Calculations: 96 kg (211 lb 10.3 oz)  Energy Calorie Requirements (kcal): 2400 kcal (25 kcal/kg)  Energy Need Method: Kcal/kg  Weight Used For Protein Calculations: 96 kg (211 lb 10.3 oz)  Protein Requirements:  gm (1 - 1.1 gm/kg)  Fluid Requirements (mL): 2400 ml (1ml/kcal)  Temp (24hrs), Av.2 °F (36.8 °C), Min:98 °F (36.7 °C), Max:98.5 °F (36.9 °C)       Enteral Nutrition    Patient not receiving enteral nutrition at this time.    Parenteral Nutrition    Patient not receiving parenteral nutrition support at this time.    Evaluation of Received Nutrient Intake    Calories: not meeting estimated needs  Protein: not meeting estimated needs    Patient Education    Not applicable.    Nutrition Diagnosis "     PES: Malnutrition related to chronic illness as evidenced by <75% nutrition intake > 1 month, > 7.5% wt loss x 3 months. (new)    Interventions/Goals     Intervention(s): modified composition of meals/snacks, commercial beverage, prescription medication, and collaboration with other providers  Goal: Meet greater than 75% of nutritional needs by follow-up. (new)    Monitoring & Evaluation     Dietitian will monitor energy intake, weight change, electrolyte/renal panel, and gastrointestinal profile.  Nutrition Risk/Follow-Up: high (follow-up in 1-4 days)   Please consult if re-assessment needed sooner.

## 2023-07-06 LAB
ABS NEUT CALC (OHS): 29.92 X10(3)/MCL (ref 2.1–9.2)
ALBUMIN SERPL-MCNC: 1.2 G/DL (ref 3.4–4.8)
ALBUMIN SERPL-MCNC: 1.2 G/DL (ref 3.4–4.8)
ALBUMIN/GLOB SERPL: 0.2 RATIO (ref 1.1–2)
ALBUMIN/GLOB SERPL: 0.3 RATIO (ref 1.1–2)
ALP SERPL-CCNC: 615 UNIT/L (ref 40–150)
ALP SERPL-CCNC: 635 UNIT/L (ref 40–150)
ALT SERPL-CCNC: 20 UNIT/L (ref 0–55)
ALT SERPL-CCNC: 20 UNIT/L (ref 0–55)
AST SERPL-CCNC: 44 UNIT/L (ref 5–34)
AST SERPL-CCNC: 48 UNIT/L (ref 5–34)
BACTERIA BLD CULT: ABNORMAL
BACTERIA BLD CULT: ABNORMAL
BASOPHILS # BLD AUTO: 0.13 X10(3)/MCL
BASOPHILS NFR BLD AUTO: 0.4 %
BILIRUBIN DIRECT+TOT PNL SERPL-MCNC: 2.3 MG/DL
BILIRUBIN DIRECT+TOT PNL SERPL-MCNC: 2.3 MG/DL
BUN SERPL-MCNC: 43.8 MG/DL (ref 8.4–25.7)
BUN SERPL-MCNC: 46.9 MG/DL (ref 8.4–25.7)
CALCIUM SERPL-MCNC: 8.7 MG/DL (ref 8.8–10)
CALCIUM SERPL-MCNC: 8.7 MG/DL (ref 8.8–10)
CHLORIDE SERPL-SCNC: 101 MMOL/L (ref 98–107)
CHLORIDE SERPL-SCNC: 102 MMOL/L (ref 98–107)
CO2 SERPL-SCNC: 21 MMOL/L (ref 23–31)
CO2 SERPL-SCNC: 22 MMOL/L (ref 23–31)
CREAT SERPL-MCNC: 2.76 MG/DL (ref 0.73–1.18)
CREAT SERPL-MCNC: 2.94 MG/DL (ref 0.73–1.18)
EOSINOPHIL # BLD AUTO: 0.19 X10(3)/MCL (ref 0–0.9)
EOSINOPHIL NFR BLD AUTO: 0.6 %
ERYTHROCYTE [DISTWIDTH] IN BLOOD BY AUTOMATED COUNT: 17.2 % (ref 11.5–17)
ERYTHROCYTE [DISTWIDTH] IN BLOOD BY AUTOMATED COUNT: 17.2 % (ref 11.5–17)
GFR SERPLBLD CREATININE-BSD FMLA CKD-EPI: 23 MLS/MIN/1.73/M2
GFR SERPLBLD CREATININE-BSD FMLA CKD-EPI: 25 MLS/MIN/1.73/M2
GLOBULIN SER-MCNC: 4.8 GM/DL (ref 2.4–3.5)
GLOBULIN SER-MCNC: 5 GM/DL (ref 2.4–3.5)
GLUCOSE SERPL-MCNC: 127 MG/DL (ref 82–115)
GLUCOSE SERPL-MCNC: 163 MG/DL (ref 82–115)
GRAM STN SPEC: ABNORMAL
HCT VFR BLD AUTO: 22.9 % (ref 42–52)
HCT VFR BLD AUTO: 23.1 % (ref 42–52)
HGB BLD-MCNC: 7.4 G/DL (ref 14–18)
HGB BLD-MCNC: 7.5 G/DL (ref 14–18)
IMM GRANULOCYTES # BLD AUTO: 1.32 X10(3)/MCL (ref 0–0.04)
IMM GRANULOCYTES NFR BLD AUTO: 4.2 %
LACTATE SERPL-SCNC: 1.7 MMOL/L (ref 0.5–2.2)
LYMPHOCYTES # BLD AUTO: 1.11 X10(3)/MCL (ref 0.6–4.6)
LYMPHOCYTES NFR BLD AUTO: 3.6 %
LYMPHOCYTES NFR BLD MANUAL: 0.95 X10(3)/MCL
LYMPHOCYTES NFR BLD MANUAL: 3 % (ref 13–40)
MAGNESIUM SERPL-MCNC: 2.4 MG/DL (ref 1.6–2.6)
MCH RBC QN AUTO: 25.9 PG (ref 27–31)
MCH RBC QN AUTO: 26.1 PG (ref 27–31)
MCHC RBC AUTO-ENTMCNC: 32.3 G/DL (ref 33–36)
MCHC RBC AUTO-ENTMCNC: 32.5 G/DL (ref 33–36)
MCV RBC AUTO: 79.7 FL (ref 80–94)
MCV RBC AUTO: 80.6 FL (ref 80–94)
METAMYELOCYTES NFR BLD MANUAL: 2 %
MONOCYTES # BLD AUTO: 1.83 X10(3)/MCL (ref 0.1–1.3)
MONOCYTES NFR BLD AUTO: 5.9 %
MONOCYTES NFR BLD MANUAL: 0.32 X10(3)/MCL (ref 0.1–1.3)
MONOCYTES NFR BLD MANUAL: 1 % (ref 2–11)
NEUTROPHILS # BLD AUTO: 26.65 X10(3)/MCL (ref 2.1–9.2)
NEUTROPHILS NFR BLD AUTO: 85.3 %
NEUTROPHILS NFR BLD MANUAL: 94 % (ref 47–80)
NRBC BLD AUTO-RTO: 0 %
NRBC BLD AUTO-RTO: 0 %
PHOSPHATE SERPL-MCNC: 3.4 MG/DL (ref 2.3–4.7)
PLATELET # BLD AUTO: 612 X10(3)/MCL (ref 130–400)
PLATELET # BLD AUTO: 613 X10(3)/MCL (ref 130–400)
PLATELET # BLD EST: ABNORMAL 10*3/UL
PMV BLD AUTO: 9.4 FL (ref 7.4–10.4)
PMV BLD AUTO: 9.6 FL (ref 7.4–10.4)
POTASSIUM SERPL-SCNC: 4.6 MMOL/L (ref 3.5–5.1)
POTASSIUM SERPL-SCNC: 4.7 MMOL/L (ref 3.5–5.1)
PROT SERPL-MCNC: 6 GM/DL (ref 5.8–7.6)
PROT SERPL-MCNC: 6.2 GM/DL (ref 5.8–7.6)
RBC # BLD AUTO: 2.84 X10(6)/MCL (ref 4.7–6.1)
RBC # BLD AUTO: 2.9 X10(6)/MCL (ref 4.7–6.1)
RBC MORPH BLD: NORMAL
SODIUM SERPL-SCNC: 134 MMOL/L (ref 136–145)
SODIUM SERPL-SCNC: 134 MMOL/L (ref 136–145)
WBC # SPEC AUTO: 31.23 X10(3)/MCL (ref 4.5–11.5)
WBC # SPEC AUTO: 31.83 X10(3)/MCL (ref 4.5–11.5)

## 2023-07-06 PROCEDURE — 97116 GAIT TRAINING THERAPY: CPT

## 2023-07-06 PROCEDURE — 25000003 PHARM REV CODE 250: Performed by: STUDENT IN AN ORGANIZED HEALTH CARE EDUCATION/TRAINING PROGRAM

## 2023-07-06 PROCEDURE — 85025 COMPLETE CBC W/AUTO DIFF WBC: CPT | Performed by: STUDENT IN AN ORGANIZED HEALTH CARE EDUCATION/TRAINING PROGRAM

## 2023-07-06 PROCEDURE — 85027 COMPLETE CBC AUTOMATED: CPT | Performed by: STUDENT IN AN ORGANIZED HEALTH CARE EDUCATION/TRAINING PROGRAM

## 2023-07-06 PROCEDURE — 80053 COMPREHEN METABOLIC PANEL: CPT | Performed by: STUDENT IN AN ORGANIZED HEALTH CARE EDUCATION/TRAINING PROGRAM

## 2023-07-06 PROCEDURE — S0179 MEGESTROL 20 MG: HCPCS | Performed by: STUDENT IN AN ORGANIZED HEALTH CARE EDUCATION/TRAINING PROGRAM

## 2023-07-06 PROCEDURE — 83605 ASSAY OF LACTIC ACID: CPT | Performed by: STUDENT IN AN ORGANIZED HEALTH CARE EDUCATION/TRAINING PROGRAM

## 2023-07-06 PROCEDURE — 63600175 PHARM REV CODE 636 W HCPCS: Performed by: STUDENT IN AN ORGANIZED HEALTH CARE EDUCATION/TRAINING PROGRAM

## 2023-07-06 PROCEDURE — 87077 CULTURE AEROBIC IDENTIFY: CPT | Performed by: STUDENT IN AN ORGANIZED HEALTH CARE EDUCATION/TRAINING PROGRAM

## 2023-07-06 PROCEDURE — 11000001 HC ACUTE MED/SURG PRIVATE ROOM

## 2023-07-06 PROCEDURE — 83735 ASSAY OF MAGNESIUM: CPT | Performed by: STUDENT IN AN ORGANIZED HEALTH CARE EDUCATION/TRAINING PROGRAM

## 2023-07-06 PROCEDURE — 94761 N-INVAS EAR/PLS OXIMETRY MLT: CPT

## 2023-07-06 PROCEDURE — 84100 ASSAY OF PHOSPHORUS: CPT | Performed by: STUDENT IN AN ORGANIZED HEALTH CARE EDUCATION/TRAINING PROGRAM

## 2023-07-06 PROCEDURE — 97530 THERAPEUTIC ACTIVITIES: CPT

## 2023-07-06 RX ADMIN — PIPERACILLIN AND TAZOBACTAM 4.5 G: 4; .5 INJECTION, POWDER, LYOPHILIZED, FOR SOLUTION INTRAVENOUS; PARENTERAL at 08:07

## 2023-07-06 RX ADMIN — PIPERACILLIN AND TAZOBACTAM 4.5 G: 4; .5 INJECTION, POWDER, LYOPHILIZED, FOR SOLUTION INTRAVENOUS; PARENTERAL at 02:07

## 2023-07-06 RX ADMIN — HEPARIN SODIUM 5000 UNITS: 5000 INJECTION, SOLUTION INTRAVENOUS; SUBCUTANEOUS at 08:07

## 2023-07-06 RX ADMIN — MORPHINE SULFATE 15 MG: 15 TABLET, EXTENDED RELEASE ORAL at 08:07

## 2023-07-06 RX ADMIN — MEGESTROL ACETATE 800 MG: 400 SUSPENSION ORAL at 08:07

## 2023-07-06 RX ADMIN — CEFTRIAXONE SODIUM 1 G: 1 INJECTION, POWDER, FOR SOLUTION INTRAMUSCULAR; INTRAVENOUS at 04:07

## 2023-07-06 RX ADMIN — PANTOPRAZOLE 40 MG: 40 TABLET, DELAYED RELEASE ORAL at 08:07

## 2023-07-06 RX ADMIN — CEFTRIAXONE SODIUM 1 G: 1 INJECTION, POWDER, FOR SOLUTION INTRAMUSCULAR; INTRAVENOUS at 11:07

## 2023-07-06 RX ADMIN — MUPIROCIN: 20 OINTMENT TOPICAL at 08:07

## 2023-07-06 RX ADMIN — HEPARIN SODIUM 5000 UNITS: 5000 INJECTION, SOLUTION INTRAVENOUS; SUBCUTANEOUS at 09:07

## 2023-07-06 RX ADMIN — POLYETHYLENE GLYCOL 3350 17 G: 17 POWDER, FOR SOLUTION ORAL at 08:07

## 2023-07-06 RX ADMIN — MORPHINE SULFATE 15 MG: 15 TABLET, EXTENDED RELEASE ORAL at 09:07

## 2023-07-06 RX ADMIN — MUPIROCIN: 20 OINTMENT TOPICAL at 09:07

## 2023-07-06 NOTE — PT/OT/SLP PROGRESS
Physical Therapy Treatment    Patient Name:  Ashkan Mathews   MRN:  [id number]    Recommendations     Discharge Recommendations:   (24 hr care (placement vs home with palliative care))   Discharge Equipment Recommendations: bedside commode, shower chair, hospital bed, walker, rolling   Barriers to discharge: fall risk, level of skilled assistance required, decreased endurance, and impaired cognitive status    Assessment     Ashkan Mathews is a 62 y.o. male admitted with a medical diagnosis of Adenocarcinoma of rectum, stage 4.    He presents with the following impairments/functional limitations:  weakness, impaired endurance, impaired self care skills, impaired functional mobility, gait instability, impaired balance, impaired cognition, decreased safety awareness, pain, impaired cardiopulmonary response to activity.    Rehab Prognosis: Fair.    Patient would benefit from continued skilled acute PT services to: address above listed impairments/functional limitations; receive patient/caregiver education; reduce fall risk; and maximize independency/safety with functional mobility.    Recent Surgery: * No surgery found *      Plan     During this hospitalization, patient to be seen 3 x/week to address the identified impairments/functional limitations via gait training, therapeutic activities, therapeutic exercises and progress toward the established goals.    Plan of Care Expires:  08/03/23    Subjective     Communicated with patient's nurse Yocasta prior to session. Nurse aware of increased pt confusion and SOB at rest and with exertion    Pt eating upon first attempt at 0843. Patient agreeable to participate in treatment session.    Chief Complaint: increased rectal pain following bowel movement  Patient goals: wants to go home  Family Comments: Pt's sister in law at bedside and reports her  (pt's brother) is headed back into town to discuss plan with pt. She states there is not family assistance    available throughout the day.  Pain/Comfort:  Pain Rating 1: 0/10  Location 1: perirectal  Pain Addressed 1: Reposition, Cessation of Activity, Nurse notified  Pain Rating Post-Intervention 1: 5/10    Objective     Patient found supine in bed with peripheral IV, telemetry  upon PT entry to room.    General Precautions: Standard, fall   Orthopedic Precautions:N/A   Braces: N/A  Respiratory Status: room air    Functional Mobility:    Bed Mobility:  Rolling Left: stand by assistance and verbal cues for handplacement  Rolling Right: stand by assistance and same as above  Supine to Sit: verbal cues and SBA  Sit to Supine: SBA    Transfers:  Sit to Stand: contact guard assistance with rolling  Upon standing pt with urgent need to use the restroom. Pt started soiling before making it to the commode. PT assisted with line management with max prompting for safety and sequencing.  Sit<>stand from commode min A with use of grab bars. PT assisted with pericare due to pt fatigue and poor stability.    Gait:  20ft x 2 trials with HHA. Pt impulsive with quick movements and poor safety awareness. Wide BRANDIE with increased lateral sway. Unable to tolerate further gait training due to fatigue and SOB. Pt with audible wheezing and SOB throughout session. SpO2 monitored and remained 95-98% on RA.     Patient left supine in bed and with HOB elevated with all lines intact, call button in reach, Yocasta notified, and sister in law present.    Goals     Multidisciplinary Problems       Physical Therapy Goals          Problem: Physical Therapy    Goal Priority Disciplines Outcome Goal Variances Interventions   Physical Therapy Goal     PT, PT/OT Ongoing, Progressing     Description: Goals to be met by: dc     Patient will increase functional independence with mobility by performin. Supine to sit with Modified [place]  2. Sit to stand transfer with Modified [place]    3. Gait  x 130 feet with Supervision using Rolling Walker.      Reviewed 7/5/2023                         Time Tracking     PT Received On: 07/06/23  PT Start Time:0920    PT Stop Time: 0945  PT Total Time (min):25    Billable Minutes: Gait Training 10 and Therapeutic Activity 15    07/06/2023

## 2023-07-07 LAB
AMMONIA PLAS-MSCNC: 24.4 UMOL/L (ref 18–72)
CHLORIDE UR-SCNC: <20 MMOL/L
OSMOLALITY UR: 366 MOSM/KG (ref 300–1300)
POTASSIUM UR-SCNC: 34 MMOL/L
SODIUM UR-SCNC: <20 MMOL/L

## 2023-07-07 PROCEDURE — 99900035 HC TECH TIME PER 15 MIN (STAT)

## 2023-07-07 PROCEDURE — 63600175 PHARM REV CODE 636 W HCPCS: Performed by: STUDENT IN AN ORGANIZED HEALTH CARE EDUCATION/TRAINING PROGRAM

## 2023-07-07 PROCEDURE — 94761 N-INVAS EAR/PLS OXIMETRY MLT: CPT

## 2023-07-07 PROCEDURE — 83935 ASSAY OF URINE OSMOLALITY: CPT | Performed by: STUDENT IN AN ORGANIZED HEALTH CARE EDUCATION/TRAINING PROGRAM

## 2023-07-07 PROCEDURE — S0179 MEGESTROL 20 MG: HCPCS | Performed by: STUDENT IN AN ORGANIZED HEALTH CARE EDUCATION/TRAINING PROGRAM

## 2023-07-07 PROCEDURE — 94760 N-INVAS EAR/PLS OXIMETRY 1: CPT

## 2023-07-07 PROCEDURE — 25000003 PHARM REV CODE 250: Performed by: STUDENT IN AN ORGANIZED HEALTH CARE EDUCATION/TRAINING PROGRAM

## 2023-07-07 PROCEDURE — 84300 ASSAY OF URINE SODIUM: CPT | Performed by: STUDENT IN AN ORGANIZED HEALTH CARE EDUCATION/TRAINING PROGRAM

## 2023-07-07 PROCEDURE — 97110 THERAPEUTIC EXERCISES: CPT

## 2023-07-07 PROCEDURE — 97530 THERAPEUTIC ACTIVITIES: CPT

## 2023-07-07 PROCEDURE — 84133 ASSAY OF URINE POTASSIUM: CPT | Performed by: STUDENT IN AN ORGANIZED HEALTH CARE EDUCATION/TRAINING PROGRAM

## 2023-07-07 PROCEDURE — 82140 ASSAY OF AMMONIA: CPT | Performed by: STUDENT IN AN ORGANIZED HEALTH CARE EDUCATION/TRAINING PROGRAM

## 2023-07-07 PROCEDURE — 82436 ASSAY OF URINE CHLORIDE: CPT | Performed by: STUDENT IN AN ORGANIZED HEALTH CARE EDUCATION/TRAINING PROGRAM

## 2023-07-07 PROCEDURE — 11000001 HC ACUTE MED/SURG PRIVATE ROOM

## 2023-07-07 RX ORDER — IPRATROPIUM BROMIDE AND ALBUTEROL SULFATE 2.5; .5 MG/3ML; MG/3ML
3 SOLUTION RESPIRATORY (INHALATION) EVERY 6 HOURS PRN
Status: DISCONTINUED | OUTPATIENT
Start: 2023-07-07 | End: 2023-07-10 | Stop reason: HOSPADM

## 2023-07-07 RX ORDER — SODIUM CHLORIDE, SODIUM LACTATE, POTASSIUM CHLORIDE, CALCIUM CHLORIDE 600; 310; 30; 20 MG/100ML; MG/100ML; MG/100ML; MG/100ML
INJECTION, SOLUTION INTRAVENOUS CONTINUOUS
Status: DISCONTINUED | OUTPATIENT
Start: 2023-07-07 | End: 2023-07-10 | Stop reason: HOSPADM

## 2023-07-07 RX ADMIN — POLYETHYLENE GLYCOL 3350 17 G: 17 POWDER, FOR SOLUTION ORAL at 08:07

## 2023-07-07 RX ADMIN — MUPIROCIN: 20 OINTMENT TOPICAL at 08:07

## 2023-07-07 RX ADMIN — PANTOPRAZOLE 40 MG: 40 TABLET, DELAYED RELEASE ORAL at 08:07

## 2023-07-07 RX ADMIN — MORPHINE SULFATE 15 MG: 15 TABLET, EXTENDED RELEASE ORAL at 08:07

## 2023-07-07 RX ADMIN — MEGESTROL ACETATE 800 MG: 400 SUSPENSION ORAL at 08:07

## 2023-07-07 RX ADMIN — CEFTRIAXONE SODIUM 2 G: 2 INJECTION, POWDER, FOR SOLUTION INTRAMUSCULAR; INTRAVENOUS at 11:07

## 2023-07-07 RX ADMIN — SODIUM CHLORIDE, POTASSIUM CHLORIDE, SODIUM LACTATE AND CALCIUM CHLORIDE: 600; 310; 30; 20 INJECTION, SOLUTION INTRAVENOUS at 11:07

## 2023-07-07 RX ADMIN — HEPARIN SODIUM 5000 UNITS: 5000 INJECTION, SOLUTION INTRAVENOUS; SUBCUTANEOUS at 08:07

## 2023-07-07 NOTE — PT/OT/SLP PROGRESS
Physical Therapy Treatment    Patient Name:  Ashkan Mathews   MRN:  [id number]    Recommendations     Discharge Recommendations:  home with hospice   Discharge Equipment Recommendations: walker, rolling   Barriers to discharge: fall risk, level of skilled assistance required, decreased endurance, and decreased caregiver support    Assessment     Ashkan Mathews is a 62 y.o. male admitted with a medical diagnosis of Adenocarcinoma of rectum, stage 4.    He presents with the following impairments/functional limitations:  weakness, impaired endurance, impaired balance, decreased lower extremity function, decreased safety awareness, impaired cardiopulmonary response to activity.    Rehab Prognosis: Fair.    Patient would benefit from continued skilled acute PT services to: address above listed impairments/functional limitations; receive patient/caregiver education; reduce fall risk; and maximize independency/safety with functional mobility.    Recent Surgery: * No surgery found *      Plan     During this hospitalization, patient to be seen 3 x/week to address the identified impairments/functional limitations via gait training, therapeutic activities, therapeutic exercises, neuromuscular re-education and progress toward the established goals.    Plan of Care Expires:  08/03/23    Subjective     Communicated with patient's nurse  prior to session.    Patient agreeable to participate in treatment session.    Chief Complaint: None stated  Patient/Family Comments/goals: To get strong to go home  Pain/Comfort:  Pain Rating 1: 0/10  Pain Rating Post-Intervention 1: 0/10    Objective     Patient found supine in bed with peripheral IV, telemetry  upon PT entry to room.    General Precautions: Standard, fall   Orthopedic Precautions:N/A   Braces:    Respiratory Status: room air    Functional Mobility:    Bed Mobility:  Rolling Left: contact guard assistance  Rolling Right: contact guard assistance  Supine to Sit: contact  guard assistance  Sit to Supine: contact guard assistance    Transfers:  Sit to Stand: contact guard assistance with no assistive device x 20 reps  Static and dynamic sitting EOB x 10 minutes     Gait:  Patient ambulated 25ft with hand-held assist and minimum assistance.  Patient demonstrates unsteady gait, wide base of support, and decreased weight shift.    Refused walker    Other Mobility:  Therapeutic Exercises performed:   3 sets times 10 repetitions  bilat lower extremity       -bed level exercises:              bridging            SLR's            hip abduction            heel slides            short arc quads    Patient left supine in bed with all lines intact.    Goals     Multidisciplinary Problems       Physical Therapy Goals          Problem: Physical Therapy    Goal Priority Disciplines Outcome Goal Variances Interventions   Physical Therapy Goal     PT, PT/OT Ongoing, Progressing     Description: Goals to be met by: dc     Patient will increase functional independence with mobility by performin. Supine to sit with Modified [place]  2. Sit to stand transfer with Modified [place]    3. Gait  x 130 feet with Supervision using Rolling Walker.     Reviewed 2023                         Time Tracking     PT Received On: 23  PT Start Time: 1432     PT Stop Time: 1505  PT Total Time (min): 33 min     Billable Minutes: Therapeutic Activity 15 and Therapeutic Exercise 10  Gait x 5minutes   2023

## 2023-07-07 NOTE — PROGRESS NOTES
Inpatient Nutrition Assessment    Admit Date: 7/3/2023   Total duration of encounter: 4 days     Nutrition Recommendation/Prescription     Suggest [place], Low Residue diet for best tolerance  Boost Plus (provides 360 kcal, 14 g protein per serving) TID  Medical management of abdominal pain  Continue Megace to stimulate appetite      Communication of Recommendations: reviewed with patient and reviewed with family    Nutrition Assessment     Malnutrition Assessment/Nutrition-Focused Physical Exam    Malnutrition Context: chronic illness  Malnutrition Level: severe  Energy Intake (Malnutrition): less than 75% for greater than or equal to 1 month  Weight Loss (Malnutrition): greater than 7.5% in 3 months                                                  A minimum of two characteristics is recommended for diagnosis of either severe or non-severe malnutrition.    Chart Review    Reason Seen: follow-up    Malnutrition Screening Tool Results   Have you recently lost weight without trying?: Unsure  Have you been eating poorly because of a decreased appetite?: Yes   MST Score: 3     Diagnosis:  Stage IV Metastatic colon cancer, Anemia, Likely lower GI Bleed, Sepsis, E.Coli Bacteremia    Relevant Medical History: CAD, HLD, HTN, Advanced Metastatic colo-rectal cancer    Nutrition-Related Medications: Megace, Protonix, Miralax, Rocephin  Calorie Containing IV Medications: no significant kcals from medications at this time    Nutrition-Related Labs:  7/5/23 -- H/H 7.9/23.9 L, Na 133 L, K 4.4, BUN 36 H, Cr 2.2, Phos 2.6  7/7/23 -- H/H 7.4/22.9 L, K 4.7, Glu 163 H, BUN 46.9 H, Cr 2.9 H, Phos 3.4    Diet/PN Order: Diet Adult Regular  Oral Supplement Order: Boost Plus  Tube Feeding Order: none  Appetite/Oral Intake: poor/25-50% of meals  Factors Affecting Nutritional Intake: abdominal pain, impaired cognitive status/motor control, and decreased appetite  Food/Denominational/Cultural Preferences: none reported  Food Allergies: none  "reported       Wound(s):   skin intact     Comments    23 -- Pt appears mildly confused, nsing reports MRI ordered; Accepted to palliative care noted; poor oral intake of meals & ONS, multiple foods options offered, pt request ham sandwich only; Continue Megace to stimulant appetite & continue to offer ONS; no n/v, LBM documented on 23 -- Pt reports fair appetite > 1 month, reports eating ~50% of his breakfast this am, Megace ordered noted for appetite, pt reports prescribed PTA but not taking it regularly, encouraged compliance for best results; no n/v, LBM  (loose); Pt with significant wt loss over the last 3 months noted, willing to try Boost ONS in chocolate flavor; Consult to hospice noted, will monitor plan of care    Anthropometrics    Height: 6' 2" (188 cm) Height Method: Stated  Last Weight: 102.9 kg (226 lb 13.7 oz) (23 1143) Weight Method: Bed Scale  BMI (Calculated): 29.1  BMI Classification: overweight (BMI 25-29.9)        Ideal Body Weight (IBW), Male: 190 lb     % Ideal Body Weight, Male (lb): 112.09 %                 Usual Body Weight (UBW), k kg  % Usual Body Weight: 91.32     Usual Weight Provided By: EMR weight history    Wt Readings from Last 5 Encounters:   23 102.9 kg (226 lb 13.7 oz)   23 96.2 kg (212 lb)   23 96.2 kg (212 lb 1.3 oz)   23 96.2 kg (212 lb 1.3 oz)   23 99.3 kg (219 lb)   23 100.7 kg  23 106.4 kg    Weight Change(s) Since Admission:  Admit Weight: 94.6 kg (208 lb 8.9 oz) (23 1548)  10% wt loss x 3 months, significant  23 -- 102.9 kg, bed    Estimated Needs    Weight Used For Calorie Calculations: 96 kg (211 lb 10.3 oz)  Energy Calorie Requirements (kcal): 2400 kcal (25 kcal/kg)  Energy Need Method: Kcal/kg  Weight Used For Protein Calculations: 96 kg (211 lb 10.3 oz)  Protein Requirements:  gm (1 - 1.1 gm/kg)  Fluid Requirements (mL): 2400 ml (1ml/kcal)  Temp (24hrs), Av.2 °F (36.8 °C), " Min:97.6 °F (36.4 °C), Max:98.7 °F (37.1 °C)       Enteral Nutrition    Patient not receiving enteral nutrition at this time.    Parenteral Nutrition    Patient not receiving parenteral nutrition support at this time.    Evaluation of Received Nutrient Intake    Calories: not meeting estimated needs  Protein: not meeting estimated needs    Patient Education    Not applicable.    Nutrition Diagnosis     PES: Malnutrition related to chronic illness as evidenced by <75% nutrition intake > 1 month, > 7.5% wt loss x 3 months. (continues)    Interventions/Goals     Intervention(s): modified composition of meals/snacks, commercial beverage, prescription medication, and collaboration with other providers  Goal: Meet greater than 75% of nutritional needs by follow-up. (goal not met)    Monitoring & Evaluation     Dietitian will monitor energy intake, weight change, electrolyte/renal panel, and gastrointestinal profile.  Nutrition Risk/Follow-Up: high (follow-up in 1-4 days)   Please consult if re-assessment needed sooner.

## 2023-07-07 NOTE — PROGRESS NOTES
"[facility] Medicine Wards Progress Note    Resident Team: [facility] Medicine List 3  Attending Physician: Bushra Mccarty MD  Resident: Phuong  Intern: Nehemias     Date of Admit: 7/3/2023    Chief Complaint     Abdominal Pain (Pt reports dx of colo-rectal cancer, Just left Dr. Guevara's office and was told to come down to ER for admit to hospital for pain control. )    Subjective:      History of Present Illness:  Ashkan Mathews is a 62 y.o. male with PMH significant for CAD, HLD, HTN and advanced metastatic (stage IV) colo-rectal cancer who was sent down from oncology clinic for admission for hospice. Patient was recently diagnosed with adenocarcinoma of the colon on c-scope 5/4/23 with mets to liver, abdomen, and pelvis on imaging. He presented for initial appointment to discuss treatment options, but was tachycardic and hypotensive so after discussion with his oncologist, was sent to ED to be admitted and set up with hospice. Patient currently complaining of bloody BM's x2 weeks associated with fatigue and SOB. Endorses passing clots in his bowel movements. Denies fever, chills, increased cough/sputum, dysuria. Pt would like to "gain his strength" back and go home with hospice.    In the ED, labs significant for leukocytosis with left shift WBC 38.8, H&H 7/22, MCV 78.3, platelets 837. Pt hyponatremic Na 128, K 3.3; RAMANDEEP with BUN/Cr 36.4/2.57, hypoalbuminemia 1.6. Trop negative, lactate 5. Blood cultures obtained in ED< no consolidation on CXR, port-catheter with tip over SVC in place. Patient initially hypotensive with bP 78/52,  on admission, afebrile satting 100% on room air. Hypotension resolved with IVF resuscitation. Medicine consulted for admission to stabilize patient and assist with hospice coordination.    Interval Hx:  NAEO. Patient's daughter reported that patient might have had bloody bowel movement which was "dark, tarry" looking, denied bright red blood. Patient feels more fatigued today. " Denies any sob,chest pain, palpitations, dysuria, hematuria.  Pt continues to want to be a full code and accepted to palliative with [facility].        Review of Systems:  14 point ROS negative unless noted in HPI         Objective:   Last 24 Hour Vital Signs:  BP  Min: 83/52  Max: 123/73  Temp  Av.1 °F (36.7 °C)  Min: 97.6 °F (36.4 °C)  Max: 98.7 °F (37.1 °C)  Pulse  Av.5  Min: 87  Max: 99  Resp  Av.6  Min: 16  Max: 18  SpO2  Av.5 %  Min: 96 %  Max: 98 %  Body mass index is 27.34 kg/m².  I/O last 3 completed shifts:  In: 1440 [P.O.:1440]  Out: 900 [Urine:900]    Physical Examination:  Physical Exam  Constitutional:       General: He is not in acute distress.     Appearance: He is obese. He is ill-appearing. He is not toxic-appearing or diaphoretic.   HENT:      Head: Normocephalic and atraumatic.      Mouth/Throat:      Mouth: Mucous membranes are dry.      Pharynx: Oropharynx is clear.   Eyes:      Extraocular Movements: Extraocular movements intact.      Pupils: Pupils are equal, round, and reactive to light.      Comments: Pale sclera, conjunctiva   Cardiovascular:      Pulses: Normal pulses.      Heart sounds: No murmur heard.    No friction rub. No gallop.   Pulmonary:      Effort: Pulmonary effort is normal. No respiratory distress.      Breath sounds: Normal breath sounds. No wheezing, rhonchi or rales.   Abdominal:      General: Abdomen is flat. Bowel sounds are normal. There is no distension.      Palpations: Abdomen is soft.      Tenderness: Tenderness: diffuse generalized TTP.   Musculoskeletal:         General: Swelling (left upper arm swelling) present.      Right lower leg: No edema.      Left lower leg: No edema.   Skin:     General: Skin is warm and dry.      Capillary Refill: Capillary refill takes less than 2 seconds.   Neurological:      General: No focal deficit present.      Mental Status: He is alert. Mental status is at baseline.      Cranial Nerves: No cranial  nerve deficit.      Motor: No weakness.      Comments: Oriented to self only today, but pleasant         Laboratory:  Most Recent Data:  CBC:   Lab Results   Component Value Date    WBC 31.23 (H) 07/06/2023    HGB 7.4 (L) 07/06/2023    HCT 22.9 (L) 07/06/2023     (H) 07/06/2023    MCV 80.6 07/06/2023    RDW 17.2 (H) 07/06/2023     WBC Differential:   Recent Labs   Lab 07/03/23  1604 07/04/23  0713 07/05/23  0431 07/06/23  0351 07/06/23  1220   WBC 38.79* 27.03* 27.75* 31.83* 31.23*   HGB 7.0* 8.5* 7.9* 7.5* 7.4*   HCT 22.0* 25.9* 23.9* 23.1* 22.9*   * 568* 552* 613* 612*   MCV 78.3* 79.4* 79.9* 79.7* 80.6       BMP:   Lab Results   Component Value Date     (L) 07/06/2023    K 4.7 07/06/2023    CO2 22 (L) 07/06/2023    BUN 46.9 (H) 07/06/2023    CREATININE 2.94 (H) 07/06/2023    CALCIUM 8.7 (L) 07/06/2023    MG 2.40 07/06/2023    PHOS 3.4 07/06/2023     LFTs:   Lab Results   Component Value Date    ALBUMIN 1.2 (L) 07/06/2023    BILITOT 2.3 (H) 07/06/2023    AST 48 (H) 07/06/2023    ALKPHOS 615 (H) 07/06/2023    ALT 20 07/06/2023     Coags:   Lab Results   Component Value Date    INR 1.12 06/08/2023    PROTIME 14.2 06/08/2023    PTT 33.7 04/24/2023     FLP:   Lab Results   Component Value Date    CHOL 211 (H) 04/07/2021    HDL 57 04/07/2021    LDLCALC 103 04/07/2021    TRIG 256 (H) 04/07/2021     DM:   Lab Results   Component Value Date    LDLCALC 103 04/07/2021    CREATININE 2.94 (H) 07/06/2023     Thyroid: No results found for: TSH, FREET4, I3SJCDJ, I9OXKIG, THYROIDAB  Anemia: No results found for: IRON, TIBC, FERRITIN, [id number], FOLATE  Cardiac:   Lab Results   Component Value Date    TROPONINI <0.010 07/03/2023     Urinalysis:   Lab Results   Component Value Date    PHUA 5.5 07/04/2023    UROBILINOGEN 1+ (A) 07/04/2023    WBCUA 0-5 07/04/2023       Trended Lab Data:  Recent Labs   Lab 07/05/23  0431 07/06/23  0351 07/06/23  0437 07/06/23  1220   WBC 27.75* 31.83*  --  31.23*   HGB 7.9* 7.5*   --  7.4*   HCT 23.9* 23.1*  --  22.9*   * 613*  --  612*   MCV 79.9* 79.7*  --  80.6   RDW 16.1 17.2*  --  17.2*   *  --  134* 134*   K 4.4  --  4.6 4.7   CO2 20*  --  21* 22*   BUN 36.9*  --  43.8* 46.9*   CREATININE 2.27*  --  2.76* 2.94*   ALBUMIN 1.2*  --  1.2* 1.2*   BILITOT 2.2*  --  2.3* 2.3*   AST 36*  --  44* 48*   ALKPHOS 674*  --  635* 615*   ALT 18  --  20 20         Trended Cardiac Data:  Recent Labs   Lab 07/03/23  1604   TROPONINI <0.010         Microbiology Data:  Blood cultures 7/3/23 x2 sets positive E. Coli with biofire confirmation (no ESBL) resistant to cipro but sensitive to rocephin   Repeat blood culture 7/6/23: GNR x 2 sets  Other Results:  EKG (my interpretation): n/a    Radiology:  Imaging Results              X-Ray Chest AP Portable (Final result)  Result time 07/03/23 16:28:43      Final result by Yady Guillermo MD (07/03/23 16:28:43)                   Impression:      No acute abnormality of the chest.      Electronically signed by: Yady Guillermo  Date:    07/03/2023  Time:    16:28               Narrative:    EXAMINATION:  XR CHEST AP PORTABLE    CLINICAL HISTORY:  Sepsis;    COMPARISON:  Chest x-ray dated 06/05/2023    FINDINGS:  Right-sided chest wall port catheter has its tip over the superior vena cava.  The heart is normal in size.  The lungs are clear.  There is no pleural effusion or visible pneumothorax.                                           Assessment & Plan:     Stage IV Metastatic Colon Cancer       - Advanced metastatic colon cancer       - Mets noted to liver, abdomen/pelvis       - Pt not a candidate for chemo/radiation/surgical resection; agreed to hospice dispo with oncologist       - Patient accepted to palliative with [facility].      Anemia  Likely Lower GI bleed 2/2 above       - H&H 7/22 down from 11.4/34.1 six weeks prior       - S/p 2 units pRBC, H&H of 7.5/23.1 Will continue to trend, transfuse for Hgb < 7       - No need for GI  services given palliative treatment         - Given hypotensive episode this morning, will repeat H/H, lactic acid cmp and hold BB       - Will stop fluids given pt is more swollen this morning with CXR suggestive of vascular congestion          E. Coli Bacteremia  Sepsis        - WBC 38K with left shift, HR > 90, +blood cultures x2 E. Coli, sensitive to rocephin; UA showed no signs of infection, likely source from GI tract given colon cancer       - Switched from  Zosyn  IV Rocephin  Abx, Will need total of 14 days of IV abx given pt resistant to cipro (Last day 7/17/23)       -repeat blood cultures on 7/6 at 48h jose a of previous were + GNR       -Pt accepted to palliative with [facility] but does not have insurance to receive home health for IV abx.    RAMANDEEP       - BUN/Cr on admit 36.4/2.57, has fluctuated down now continuing to trend up 46.9/2.94 today       - Unsure if ATN from hypotension, no casts seen in urine; occasional crystals, no sign of UTI on UA       - BUN/Cr worsening suggestive of prerenal possibly from being on zosyn       - Will restart mIVF today  cc/hr; obtain urine osm, Na, K, Cl     AMS       - Pt confused yesterday per nurse, and again today. Oriented to self and is pleasant, but disoriented to place/time/situation which is new since admission.        - Possibly delirium, but will perform MRI brain without contrast given poor renal function. Delirium precautions as well    Left Arm Swelling        - Pt's left arm swollen, had been having mIVF to this arm        - Denies pain, SOB; improves with elevation of arm        - Will order US DVT L upper extremity to rule out DVT    CODE STATUS: Full Code  Access: PIV  Antibiotics: None  Diet: regular diet   DVT Prophylaxis: SCD, will start heparin 5K BID DVT ppx given increased risk with malignancy  GI Prophylaxis: Protonix  Fluids: None       Disposition: Pt accepted to palliative with [facility] but does not have insurance to receive home health  for IV abx for total of 14 days         Dixon Baca MD  [employer] IM PGY III

## 2023-07-07 NOTE — PLAN OF CARE
Patient's family is continuing to work on obtaining necessary documents for Medicaid application.

## 2023-07-07 NOTE — PLAN OF CARE
Problem: Adult Inpatient Plan of Care  Goal: Plan of Care Review  Outcome: Ongoing, Progressing  Flowsheets (Taken 7/7/2023 1758)  Plan of Care Reviewed With: patient  Goal: Patient-Specific Goal (Individualized)  Outcome: Ongoing, Progressing  Goal: Absence of Hospital-Acquired Illness or Injury  Outcome: Ongoing, Progressing  Intervention: Identify and Manage Fall Risk  Flowsheets (Taken 7/7/2023 1758)  Safety Promotion/Fall Prevention:   assistive device/personal item within reach   side rails raised x 2  Intervention: Prevent and Manage VTE (Venous Thromboembolism) Risk  Flowsheets (Taken 7/7/2023 1758)  Range of Motion: active ROM (range of motion) encouraged  Goal: Optimal Comfort and Wellbeing  Outcome: Ongoing, Progressing  Goal: Readiness for Transition of Care  Outcome: Ongoing, Progressing     Problem: Pain Acute  Goal: Acceptable Pain Control and Functional Ability  Outcome: Ongoing, Progressing

## 2023-07-08 LAB
ABO + RH BLD: NORMAL
ALBUMIN SERPL-MCNC: 1.3 G/DL (ref 3.4–4.8)
ALBUMIN/GLOB SERPL: 0.3 RATIO (ref 1.1–2)
ALP SERPL-CCNC: 699 UNIT/L (ref 40–150)
ALT SERPL-CCNC: 17 UNIT/L (ref 0–55)
AST SERPL-CCNC: 47 UNIT/L (ref 5–34)
BACTERIA BLD CULT: ABNORMAL
BASOPHILS # BLD AUTO: 0.08 X10(3)/MCL
BASOPHILS NFR BLD AUTO: 0.2 %
BILIRUBIN DIRECT+TOT PNL SERPL-MCNC: 2.7 MG/DL
BLD PROD TYP BPU: NORMAL
BLOOD UNIT EXPIRATION DATE: NORMAL
BLOOD UNIT TYPE CODE: 5100
BUN SERPL-MCNC: 47.1 MG/DL (ref 8.4–25.7)
CALCIUM SERPL-MCNC: 8.7 MG/DL (ref 8.8–10)
CHLORIDE SERPL-SCNC: 104 MMOL/L (ref 98–107)
CO2 SERPL-SCNC: 20 MMOL/L (ref 23–31)
CREAT SERPL-MCNC: 2.42 MG/DL (ref 0.73–1.18)
CROSSMATCH INTERPRETATION: NORMAL
DISPENSE STATUS: NORMAL
EOSINOPHIL # BLD AUTO: 0.11 X10(3)/MCL (ref 0–0.9)
EOSINOPHIL NFR BLD AUTO: 0.3 %
ERYTHROCYTE [DISTWIDTH] IN BLOOD BY AUTOMATED COUNT: 17.6 % (ref 11.5–17)
GFR SERPLBLD CREATININE-BSD FMLA CKD-EPI: 29 MLS/MIN/1.73/M2
GLOBULIN SER-MCNC: 5 GM/DL (ref 2.4–3.5)
GLUCOSE SERPL-MCNC: 131 MG/DL (ref 82–115)
GRAM STN SPEC: ABNORMAL
GROUP & RH: NORMAL
HCT VFR BLD AUTO: 22.1 % (ref 42–52)
HGB BLD-MCNC: 7.1 G/DL (ref 14–18)
IMM GRANULOCYTES # BLD AUTO: 1.44 X10(3)/MCL (ref 0–0.04)
IMM GRANULOCYTES NFR BLD AUTO: 4.4 %
INDIRECT COOMBS GEL: NORMAL
LACTATE SERPL-SCNC: 2.2 MMOL/L (ref 0.5–2.2)
LYMPHOCYTES # BLD AUTO: 1.11 X10(3)/MCL (ref 0.6–4.6)
LYMPHOCYTES NFR BLD AUTO: 3.4 %
MAGNESIUM SERPL-MCNC: 2.3 MG/DL (ref 1.6–2.6)
MCH RBC QN AUTO: 25.6 PG (ref 27–31)
MCHC RBC AUTO-ENTMCNC: 32.1 G/DL (ref 33–36)
MCV RBC AUTO: 79.8 FL (ref 80–94)
MONOCYTES # BLD AUTO: 1.92 X10(3)/MCL (ref 0.1–1.3)
MONOCYTES NFR BLD AUTO: 5.9 %
NEUTROPHILS # BLD AUTO: 27.79 X10(3)/MCL (ref 2.1–9.2)
NEUTROPHILS NFR BLD AUTO: 85.8 %
NRBC BLD AUTO-RTO: 0 %
PHOSPHATE SERPL-MCNC: 4.5 MG/DL (ref 2.3–4.7)
PLATELET # BLD AUTO: 596 X10(3)/MCL (ref 130–400)
PMV BLD AUTO: 9.4 FL (ref 7.4–10.4)
POTASSIUM SERPL-SCNC: 4.5 MMOL/L (ref 3.5–5.1)
PROT SERPL-MCNC: 6.3 GM/DL (ref 5.8–7.6)
RBC # BLD AUTO: 2.77 X10(6)/MCL (ref 4.7–6.1)
SODIUM SERPL-SCNC: 138 MMOL/L (ref 136–145)
SPECIMEN OUTDATE: NORMAL
UNIT NUMBER: NORMAL
WBC # SPEC AUTO: 32.45 X10(3)/MCL (ref 4.5–11.5)

## 2023-07-08 PROCEDURE — 25000003 PHARM REV CODE 250: Performed by: STUDENT IN AN ORGANIZED HEALTH CARE EDUCATION/TRAINING PROGRAM

## 2023-07-08 PROCEDURE — S0179 MEGESTROL 20 MG: HCPCS | Performed by: STUDENT IN AN ORGANIZED HEALTH CARE EDUCATION/TRAINING PROGRAM

## 2023-07-08 PROCEDURE — 94760 N-INVAS EAR/PLS OXIMETRY 1: CPT

## 2023-07-08 PROCEDURE — 86920 COMPATIBILITY TEST SPIN: CPT | Performed by: STUDENT IN AN ORGANIZED HEALTH CARE EDUCATION/TRAINING PROGRAM

## 2023-07-08 PROCEDURE — 83735 ASSAY OF MAGNESIUM: CPT | Performed by: STUDENT IN AN ORGANIZED HEALTH CARE EDUCATION/TRAINING PROGRAM

## 2023-07-08 PROCEDURE — 36430 TRANSFUSION BLD/BLD COMPNT: CPT

## 2023-07-08 PROCEDURE — 63600175 PHARM REV CODE 636 W HCPCS: Performed by: STUDENT IN AN ORGANIZED HEALTH CARE EDUCATION/TRAINING PROGRAM

## 2023-07-08 PROCEDURE — 94640 AIRWAY INHALATION TREATMENT: CPT

## 2023-07-08 PROCEDURE — 86900 BLOOD TYPING SEROLOGIC ABO: CPT | Performed by: STUDENT IN AN ORGANIZED HEALTH CARE EDUCATION/TRAINING PROGRAM

## 2023-07-08 PROCEDURE — 94761 N-INVAS EAR/PLS OXIMETRY MLT: CPT

## 2023-07-08 PROCEDURE — 25000242 PHARM REV CODE 250 ALT 637 W/ HCPCS: Performed by: STUDENT IN AN ORGANIZED HEALTH CARE EDUCATION/TRAINING PROGRAM

## 2023-07-08 PROCEDURE — 97530 THERAPEUTIC ACTIVITIES: CPT

## 2023-07-08 PROCEDURE — P9016 RBC LEUKOCYTES REDUCED: HCPCS | Performed by: STUDENT IN AN ORGANIZED HEALTH CARE EDUCATION/TRAINING PROGRAM

## 2023-07-08 PROCEDURE — 80053 COMPREHEN METABOLIC PANEL: CPT | Performed by: STUDENT IN AN ORGANIZED HEALTH CARE EDUCATION/TRAINING PROGRAM

## 2023-07-08 PROCEDURE — 85025 COMPLETE CBC W/AUTO DIFF WBC: CPT | Performed by: STUDENT IN AN ORGANIZED HEALTH CARE EDUCATION/TRAINING PROGRAM

## 2023-07-08 PROCEDURE — 83605 ASSAY OF LACTIC ACID: CPT | Performed by: STUDENT IN AN ORGANIZED HEALTH CARE EDUCATION/TRAINING PROGRAM

## 2023-07-08 PROCEDURE — 84100 ASSAY OF PHOSPHORUS: CPT | Performed by: STUDENT IN AN ORGANIZED HEALTH CARE EDUCATION/TRAINING PROGRAM

## 2023-07-08 PROCEDURE — 11000001 HC ACUTE MED/SURG PRIVATE ROOM

## 2023-07-08 PROCEDURE — 99900035 HC TECH TIME PER 15 MIN (STAT)

## 2023-07-08 RX ORDER — HYDROCODONE BITARTRATE AND ACETAMINOPHEN 500; 5 MG/1; MG/1
TABLET ORAL
Status: DISCONTINUED | OUTPATIENT
Start: 2023-07-08 | End: 2023-07-10 | Stop reason: HOSPADM

## 2023-07-08 RX ADMIN — MORPHINE SULFATE 15 MG: 15 TABLET, EXTENDED RELEASE ORAL at 08:07

## 2023-07-08 RX ADMIN — MUPIROCIN: 20 OINTMENT TOPICAL at 08:07

## 2023-07-08 RX ADMIN — MEGESTROL ACETATE 800 MG: 400 SUSPENSION ORAL at 08:07

## 2023-07-08 RX ADMIN — PANTOPRAZOLE 40 MG: 40 TABLET, DELAYED RELEASE ORAL at 08:07

## 2023-07-08 RX ADMIN — CEFTRIAXONE SODIUM 2 G: 2 INJECTION, POWDER, FOR SOLUTION INTRAMUSCULAR; INTRAVENOUS at 12:07

## 2023-07-08 RX ADMIN — HEPARIN SODIUM 5000 UNITS: 5000 INJECTION, SOLUTION INTRAVENOUS; SUBCUTANEOUS at 08:07

## 2023-07-08 RX ADMIN — SODIUM CHLORIDE, POTASSIUM CHLORIDE, SODIUM LACTATE AND CALCIUM CHLORIDE: 600; 310; 30; 20 INJECTION, SOLUTION INTRAVENOUS at 02:07

## 2023-07-08 RX ADMIN — POLYETHYLENE GLYCOL 3350 17 G: 17 POWDER, FOR SOLUTION ORAL at 08:07

## 2023-07-08 RX ADMIN — IPRATROPIUM BROMIDE AND ALBUTEROL SULFATE 3 ML: .5; 3 SOLUTION RESPIRATORY (INHALATION) at 04:07

## 2023-07-08 RX ADMIN — SODIUM CHLORIDE, POTASSIUM CHLORIDE, SODIUM LACTATE AND CALCIUM CHLORIDE: 600; 310; 30; 20 INJECTION, SOLUTION INTRAVENOUS at 09:07

## 2023-07-08 RX ADMIN — IPRATROPIUM BROMIDE AND ALBUTEROL SULFATE 3 ML: .5; 3 SOLUTION RESPIRATORY (INHALATION) at 07:07

## 2023-07-08 NOTE — PROGRESS NOTES
"[facility] Medicine Wards Progress Note    Resident Team: [facility] Medicine List 3  Attending Physician: Bushra Mccarty MD  Resident: Phuong  Intern: Nehemias     Date of Admit: 7/3/2023    Chief Complaint     Abdominal Pain (Pt reports dx of colo-rectal cancer, Just left Dr. Guevara's office and was told to come down to ER for admit to hospital for pain control. )    Subjective:      History of Present Illness:  Ashkan Mathews is a 62 y.o. male with PMH significant for CAD, HLD, HTN and advanced metastatic (stage IV) colo-rectal cancer who was sent down from oncology clinic for admission for hospice. Patient was recently diagnosed with adenocarcinoma of the colon on c-scope 5/4/23 with mets to liver, abdomen, and pelvis on imaging. He presented for initial appointment to discuss treatment options, but was tachycardic and hypotensive so after discussion with his oncologist, was sent to ED to be admitted and set up with hospice. Patient currently complaining of bloody BM's x2 weeks associated with fatigue and SOB. Endorses passing clots in his bowel movements. Denies fever, chills, increased cough/sputum, dysuria. Pt would like to "gain his strength" back and go home with hospice.    In the ED, labs significant for leukocytosis with left shift WBC 38.8, H&H 7/22, MCV 78.3, platelets 837. Pt hyponatremic Na 128, K 3.3; RAMANDEEP with BUN/Cr 36.4/2.57, hypoalbuminemia 1.6. Trop negative, lactate 5. Blood cultures obtained in ED< no consolidation on CXR, port-catheter with tip over SVC in place. Patient initially hypotensive with bP 78/52,  on admission, afebrile satting 100% on room air. Hypotension resolved with IVF resuscitation. Medicine consulted for admission to stabilize patient and assist with hospice coordination.    Interval Hx:  NAEO. Pt has been delirious for the last 3 days but this seems to be improving, generally oriented to self, place ([facility]), and date (early July 2023). Endorsing fatigue, " denies any sob,chest pain, palpitations, dysuria, hematuria.  Pt continues to want to be a full code and accepted to palliative with [facility].        Review of Systems:  14 point ROS negative unless noted in HPI         Objective:   Last 24 Hour Vital Signs:  BP  Min: 99/60  Max: 133/73  Temp  Av.9 °F (36.6 °C)  Min: 97.6 °F (36.4 °C)  Max: 98.1 °F (36.7 °C)  Pulse  Av.9  Min: 80  Max: 115  Resp  Av.1  Min: 18  Max: 20  SpO2  Av.1 %  Min: 97 %  Max: 98 %  Weight  Av.9 kg (226 lb 13.7 oz)  Min: 102.9 kg (226 lb 13.7 oz)  Max: 102.9 kg (226 lb 13.7 oz)  Body mass index is 29.13 kg/m².  I/O last 3 completed shifts:  In: 1075 [P.O.:75; I.V.:1000]  Out: 1260 [Urine:1260]    Physical Examination:  Physical Exam  Constitutional:       General: He is not in acute distress.     Appearance: He is obese. He is ill-appearing. He is not toxic-appearing or diaphoretic.   HENT:      Head: Normocephalic and atraumatic.      Mouth/Throat:      Mouth: Mucous membranes are dry.      Pharynx: Oropharynx is clear.   Eyes:      Extraocular Movements: Extraocular movements intact.      Pupils: Pupils are equal, round, and reactive to light.      Comments: Pale sclera, conjunctiva   Cardiovascular:      Pulses: Normal pulses.      Heart sounds: No murmur heard.    No friction rub. No gallop.   Pulmonary:      Effort: Pulmonary effort is normal. No respiratory distress.      Breath sounds: Normal breath sounds. No wheezing, rhonchi or rales.   Abdominal:      General: Abdomen is flat. Bowel sounds are normal. There is no distension.      Palpations: Abdomen is soft.      Tenderness: Tenderness: diffuse generalized TTP.   Musculoskeletal:         General: Swelling (left upper arm swelling) present.      Right lower leg: No edema.      Left lower leg: No edema.   Skin:     General: Skin is warm and dry.      Capillary Refill: Capillary refill takes less than 2 seconds.   Neurological:      General: No  focal deficit present.      Mental Status: He is alert. Mental status is at baseline.      Cranial Nerves: No cranial nerve deficit.      Motor: No weakness.      Comments: Oriented to self, general date (Saturday early July 2023), general place ([facility])         Laboratory:  Most Recent Data:  CBC:   Lab Results   Component Value Date    WBC 32.45 (H) 07/08/2023    HGB 7.1 (L) 07/08/2023    HCT 22.1 (L) 07/08/2023     (H) 07/08/2023    MCV 79.8 (L) 07/08/2023    RDW 17.6 (H) 07/08/2023     WBC Differential:   Recent Labs   Lab 07/04/23  0713 07/05/23  0431 07/06/23  0351 07/06/23  1220 07/08/23  0659   WBC 27.03* 27.75* 31.83* 31.23* 32.45*   HGB 8.5* 7.9* 7.5* 7.4* 7.1*   HCT 25.9* 23.9* 23.1* 22.9* 22.1*   * 552* 613* 612* 596*   MCV 79.4* 79.9* 79.7* 80.6 79.8*       BMP:   Lab Results   Component Value Date     07/08/2023    K 4.5 07/08/2023    CO2 20 (L) 07/08/2023    BUN 47.1 (H) 07/08/2023    CREATININE 2.42 (H) 07/08/2023    CALCIUM 8.7 (L) 07/08/2023    MG 2.30 07/08/2023    PHOS 4.5 07/08/2023     LFTs:   Lab Results   Component Value Date    ALBUMIN 1.3 (L) 07/08/2023    BILITOT 2.7 (H) 07/08/2023    AST 47 (H) 07/08/2023    ALKPHOS 699 (H) 07/08/2023    ALT 17 07/08/2023     Coags:   Lab Results   Component Value Date    INR 1.12 06/08/2023    PROTIME 14.2 06/08/2023    PTT 33.7 04/24/2023     FLP:   Lab Results   Component Value Date    CHOL 211 (H) 04/07/2021    HDL 57 04/07/2021    LDLCALC 103 04/07/2021    TRIG 256 (H) 04/07/2021     DM:   Lab Results   Component Value Date    LDLCALC 103 04/07/2021    CREATININE 2.42 (H) 07/08/2023     Thyroid: No results found for: TSH, FREET4, I1TBIEK, X5WEPAT, THYROIDAB  Anemia: No results found for: IRON, TIBC, FERRITIN, [id number], FOLATE  Cardiac:   Lab Results   Component Value Date    TROPONINI <0.010 07/03/2023     Urinalysis:   Lab Results   Component Value Date    PHUA 5.5 07/04/2023    UROBILINOGEN 1+ (A) 07/04/2023    WBCUA  0-5 07/04/2023       Trended Lab Data:  Recent Labs   Lab 07/06/23  0351 07/06/23  0437 07/06/23  1220 07/08/23  0658 07/08/23  0659   WBC 31.83*  --  31.23*  --  32.45*   HGB 7.5*  --  7.4*  --  7.1*   HCT 23.1*  --  22.9*  --  22.1*   *  --  612*  --  596*   MCV 79.7*  --  80.6  --  79.8*   RDW 17.2*  --  17.2*  --  17.6*   NA  --  134* 134* 138  --    K  --  4.6 4.7 4.5  --    CO2  --  21* 22* 20*  --    BUN  --  43.8* 46.9* 47.1*  --    CREATININE  --  2.76* 2.94* 2.42*  --    ALBUMIN  --  1.2* 1.2* 1.3*  --    BILITOT  --  2.3* 2.3* 2.7*  --    AST  --  44* 48* 47*  --    ALKPHOS  --  635* 615* 699*  --    ALT  --  20 20 17  --          Trended Cardiac Data:  Recent Labs   Lab 07/03/23  1604   TROPONINI <0.010         Microbiology Data:  Blood cultures 7/3/23 x2 sets positive E. Coli with biofire confirmation (no ESBL) resistant to cipro but sensitive to rocephin   Repeat blood culture 7/6/23: GNR x 2 sets  Other Results:  EKG (my interpretation): n/a    Radiology:  Imaging Results              X-Ray Chest AP Portable (Final result)  Result time 07/03/23 16:28:43      Final result by Yady Guillermo MD (07/03/23 16:28:43)                   Impression:      No acute abnormality of the chest.      Electronically signed by: Yady Guillermo  Date:    07/03/2023  Time:    16:28               Narrative:    EXAMINATION:  XR CHEST AP PORTABLE    CLINICAL HISTORY:  Sepsis;    COMPARISON:  Chest x-ray dated 06/05/2023    FINDINGS:  Right-sided chest wall port catheter has its tip over the superior vena cava.  The heart is normal in size.  The lungs are clear.  There is no pleural effusion or visible pneumothorax.                                           Assessment & Plan:     Stage IV Metastatic Colon Cancer       - Advanced metastatic colon cancer       - Mets noted to liver, abdomen/pelvis       - Pt not a candidate for chemo/radiation/surgical resection; agreed to hospice dispo with oncologist       -  Patient accepted to palliative with hospice of [facility].      Anemia  Likely Lower GI bleed 2/2 above       - H&H 7/22 down from 11.4/34.1 six weeks prior       - S/p 2 units pRBC, H&H of 7.1/22.1 Will continue to trend       - transfuse 1 unit pRBC today       - No need for GI services given palliative treatment         - Given hypotensive episode this morning, will repeat H/H, lactic acid cmp and hold BB       - Will stop fluids given pt is more swollen this morning with CXR suggestive of vascular congestion          E. Coli Bacteremia  Sepsis        - WBC 38K with left shift, HR > 90, +blood cultures x2 E. Coli, sensitive to rocephin; UA showed no signs of infection, likely source from GI tract given colon cancer       - Switched from  Zosyn  IV Rocephin  Abx, Will need total of 14 days of IV abx given pt resistant to cipro (Last day 7/17/23)       -repeat blood cultures on 7/6 at 48h jose a of previous were + E. coli       -Pt accepted to palliative with [facility] but does not have insurance to receive home health for IV abx.    RAMANDEEP  Anion Gap Metabolic Acidosis       - BUN/Cr on admit 36.4/2.57, has fluctuated down now continuing to trend up 47.1/2.42 today       - Unsure if ATN from hypotension, no casts seen in urine; occasional crystals, no sign of UTI on UA       - Anion gap of 14; will obtain lactic acid following blood transfusion today       - Will continue LR at 125 cc/hr; will give 1 unit blood as well     AMS-improving       - Pt confused yesterday per nurse, and again today. Oriented to self and is pleasant, but disoriented to place/time/situation which is new since admission.        - MRI negative for mets/CVA       - Likely delirium; seems to be improving, generally oriented to self, place ([facility]), and date (early July 2023).    Left Arm Swelling        - Pt's left arm swollen, had been having mIVF to this arm        - DVT US LUE negative; likely from infiltrated IV    CODE STATUS: Full  Code  Access: PIV  Antibiotics: None  Diet: regular diet   DVT Prophylaxis: SCD, will start heparin 5K BID DVT ppx given increased risk with malignancy  GI Prophylaxis: Protonix  Fluids: None       Disposition: Pt [facility] but does not have insurance to receive home health for IV abx for total of 14 days         Dixon Baca MD  [employer] IM PGY III

## 2023-07-08 NOTE — PT/OT/SLP PROGRESS
Physical Therapy Treatment    Patient Name:  Ashkan Mathews   MRN:  [id number]    Recommendations     Discharge Recommendations:  home with hospice   Discharge Equipment Recommendations: bedside commode, walker, rolling   Barriers to discharge: severity of deficits, level of skilled assistance required, decreased endurance, decreased safety awareness, and medical diagnosis    Assessment     Ashkan Mathews is a 62 y.o. male admitted with a medical diagnosis of Adenocarcinoma of rectum, stage 4.    He presents with the following impairments/functional limitations:  weakness, impaired endurance, impaired self care skills, impaired functional mobility, gait instability, impaired balance, decreased safety awareness, impaired cardiopulmonary response to activity.    Rehab Prognosis: Fair.    Patient would benefit from continued skilled acute PT services to: address above listed impairments/functional limitations; receive patient/caregiver education; reduce fall risk; and maximize independency/safety with functional mobility.    Recent Surgery: * No surgery found *      Plan     During this hospitalization, patient to be seen 3 x/week to address the identified impairments/functional limitations via gait training, therapeutic activities, therapeutic exercises and progress toward the established goals.    Plan of Care Expires:  08/03/23    Subjective     Communicated with patient's nurse Venita prior to session.    Patient agreeable to participate in treatment session.    Chief Complaint: Weakness  Patient/Family Comments/goals: Go home  Pain/Comfort:  Pain Rating 1: 0/10  Pain Addressed 1: Nurse notified  Pain Rating Post-Intervention 1: 0/10    Objective     Patient found supine in bed and with HOB elevated with peripheral IV, telemetry  upon PT entry to room.    General Precautions: Standard, fall   Orthopedic Precautions:N/A   Braces: N/A  Respiratory Status: room air    Functional Mobility:    Bed  Mobility:  Rolling Left: stand by assistance  Rolling Right: stand by assistance  Scooting: stand by assistance  Supine to Sit: minimum assistance  Sit to Supine: minimum assistance    Transfers:  Sit to Stand: minimum assistance with rolling walker  Toilet Transfer: minimum assistance with hand-held assist using Step Transfer    Gait:  Patient ambulated 15ft with hand-held assist and rolling walker and minimum assistance.  Patient demonstrates decreased angelica, decreased step length, and flexed posture.    Other Mobility:  Therapeutic Activities performed:        -sitting balance activities:              scooting:                   -laterally (left)                 -laterally (right)            repositioning at edge of bed       -standing balance activities: Standing balance to clean BM  Pt had loose BM while standing and walking in room; assisted CNA with clean up and bed mobility for bed change.    Patient left supine in bed and with HOB elevated with all lines intact, call button in reach, tray table at bedside, nurse Venita notified, and aunt present.    Goals     Multidisciplinary Problems       Physical Therapy Goals          Problem: Physical Therapy    Goal Priority Disciplines Outcome Goal Variances Interventions   Physical Therapy Goal     PT, PT/OT Ongoing, Progressing     Description: Goals to be met by: dc     Patient will increase functional independence with mobility by performin. Supine to sit with Modified [place]- ONGOING  2. Sit to stand transfer with Modified [place]- ONGOING  3. Gait  x 130 feet with Supervision using Rolling Walker. - ONGOING    Reviewed 2023                         Time Tracking     PT Received On: 23  PT Start Time: 916     PT Stop Time: 956  PT Total Time (min): 40 min     Billable Minutes: Therapeutic Activity 34    2023

## 2023-07-09 LAB
ALBUMIN SERPL-MCNC: 1.2 G/DL (ref 3.4–4.8)
ALBUMIN/GLOB SERPL: 0.3 RATIO (ref 1.1–2)
ALP SERPL-CCNC: 602 UNIT/L (ref 40–150)
ALT SERPL-CCNC: 16 UNIT/L (ref 0–55)
AST SERPL-CCNC: 36 UNIT/L (ref 5–34)
BASOPHILS # BLD AUTO: 0.07 X10(3)/MCL
BASOPHILS NFR BLD AUTO: 0.2 %
BILIRUBIN DIRECT+TOT PNL SERPL-MCNC: 3.1 MG/DL
BUN SERPL-MCNC: 36.5 MG/DL (ref 8.4–25.7)
CALCIUM SERPL-MCNC: 8.6 MG/DL (ref 8.8–10)
CHLORIDE SERPL-SCNC: 106 MMOL/L (ref 98–107)
CO2 SERPL-SCNC: 20 MMOL/L (ref 23–31)
CREAT SERPL-MCNC: 1.71 MG/DL (ref 0.73–1.18)
EOSINOPHIL # BLD AUTO: 0.09 X10(3)/MCL (ref 0–0.9)
EOSINOPHIL NFR BLD AUTO: 0.3 %
ERYTHROCYTE [DISTWIDTH] IN BLOOD BY AUTOMATED COUNT: 17.8 % (ref 11.5–17)
GFR SERPLBLD CREATININE-BSD FMLA CKD-EPI: 45 MLS/MIN/1.73/M2
GLOBULIN SER-MCNC: 4.7 GM/DL (ref 2.4–3.5)
GLUCOSE SERPL-MCNC: 118 MG/DL (ref 82–115)
HCT VFR BLD AUTO: 22.9 % (ref 42–52)
HGB BLD-MCNC: 7.4 G/DL (ref 14–18)
IMM GRANULOCYTES # BLD AUTO: 1.45 X10(3)/MCL (ref 0–0.04)
IMM GRANULOCYTES NFR BLD AUTO: 4.4 %
LYMPHOCYTES # BLD AUTO: 1.14 X10(3)/MCL (ref 0.6–4.6)
LYMPHOCYTES NFR BLD AUTO: 3.5 %
MCH RBC QN AUTO: 25.6 PG (ref 27–31)
MCHC RBC AUTO-ENTMCNC: 32.3 G/DL (ref 33–36)
MCV RBC AUTO: 79.2 FL (ref 80–94)
MONOCYTES # BLD AUTO: 1.89 X10(3)/MCL (ref 0.1–1.3)
MONOCYTES NFR BLD AUTO: 5.8 %
NEUTROPHILS # BLD AUTO: 28.01 X10(3)/MCL (ref 2.1–9.2)
NEUTROPHILS NFR BLD AUTO: 85.8 %
NRBC BLD AUTO-RTO: 0 %
PLATELET # BLD AUTO: 548 X10(3)/MCL (ref 130–400)
PMV BLD AUTO: 9.5 FL (ref 7.4–10.4)
POTASSIUM SERPL-SCNC: 4.4 MMOL/L (ref 3.5–5.1)
PROT SERPL-MCNC: 5.9 GM/DL (ref 5.8–7.6)
RBC # BLD AUTO: 2.89 X10(6)/MCL (ref 4.7–6.1)
SODIUM SERPL-SCNC: 138 MMOL/L (ref 136–145)
WBC # SPEC AUTO: 32.65 X10(3)/MCL (ref 4.5–11.5)

## 2023-07-09 PROCEDURE — 25000003 PHARM REV CODE 250: Performed by: STUDENT IN AN ORGANIZED HEALTH CARE EDUCATION/TRAINING PROGRAM

## 2023-07-09 PROCEDURE — 99900035 HC TECH TIME PER 15 MIN (STAT)

## 2023-07-09 PROCEDURE — 63600175 PHARM REV CODE 636 W HCPCS: Performed by: STUDENT IN AN ORGANIZED HEALTH CARE EDUCATION/TRAINING PROGRAM

## 2023-07-09 PROCEDURE — S0179 MEGESTROL 20 MG: HCPCS | Performed by: STUDENT IN AN ORGANIZED HEALTH CARE EDUCATION/TRAINING PROGRAM

## 2023-07-09 PROCEDURE — 94761 N-INVAS EAR/PLS OXIMETRY MLT: CPT

## 2023-07-09 PROCEDURE — 85025 COMPLETE CBC W/AUTO DIFF WBC: CPT | Performed by: STUDENT IN AN ORGANIZED HEALTH CARE EDUCATION/TRAINING PROGRAM

## 2023-07-09 PROCEDURE — 94760 N-INVAS EAR/PLS OXIMETRY 1: CPT

## 2023-07-09 PROCEDURE — 80053 COMPREHEN METABOLIC PANEL: CPT | Performed by: STUDENT IN AN ORGANIZED HEALTH CARE EDUCATION/TRAINING PROGRAM

## 2023-07-09 PROCEDURE — 11000001 HC ACUTE MED/SURG PRIVATE ROOM

## 2023-07-09 RX ADMIN — MORPHINE SULFATE 15 MG: 15 TABLET, EXTENDED RELEASE ORAL at 09:07

## 2023-07-09 RX ADMIN — MEGESTROL ACETATE 800 MG: 400 SUSPENSION ORAL at 09:07

## 2023-07-09 RX ADMIN — MORPHINE SULFATE 15 MG: 15 TABLET, EXTENDED RELEASE ORAL at 08:07

## 2023-07-09 RX ADMIN — MUPIROCIN: 20 OINTMENT TOPICAL at 08:07

## 2023-07-09 RX ADMIN — SODIUM CHLORIDE, POTASSIUM CHLORIDE, SODIUM LACTATE AND CALCIUM CHLORIDE: 600; 310; 30; 20 INJECTION, SOLUTION INTRAVENOUS at 06:07

## 2023-07-09 RX ADMIN — SODIUM CHLORIDE, POTASSIUM CHLORIDE, SODIUM LACTATE AND CALCIUM CHLORIDE: 600; 310; 30; 20 INJECTION, SOLUTION INTRAVENOUS at 04:07

## 2023-07-09 RX ADMIN — HEPARIN SODIUM 5000 UNITS: 5000 INJECTION, SOLUTION INTRAVENOUS; SUBCUTANEOUS at 09:07

## 2023-07-09 RX ADMIN — CEFTRIAXONE SODIUM 2 G: 2 INJECTION, POWDER, FOR SOLUTION INTRAMUSCULAR; INTRAVENOUS at 11:07

## 2023-07-09 RX ADMIN — MUPIROCIN: 20 OINTMENT TOPICAL at 09:07

## 2023-07-09 RX ADMIN — PANTOPRAZOLE 40 MG: 40 TABLET, DELAYED RELEASE ORAL at 09:07

## 2023-07-09 RX ADMIN — HEPARIN SODIUM 5000 UNITS: 5000 INJECTION, SOLUTION INTRAVENOUS; SUBCUTANEOUS at 08:07

## 2023-07-09 NOTE — NURSING
Patient's brother, Ayaan Mathews, can be reached at [phone] and will be out of town due to work.

## 2023-07-09 NOTE — PROGRESS NOTES
"[facility] Medicine Wards Progress Note    Resident Team: [facility]  Attending Physician: Bushra Mccarty MD  Resident: Phuong  Intern: Nehemias     Date of Admit: 7/3/2023    Chief Complaint     Abdominal Pain (Pt reports dx of colo-rectal cancer, Just left Dr. Guevara's office and was told to come down to ER for admit to hospital for pain control. )    Subjective:      History of Present Illness:  Ashkan Mathews is a 62 y.o. male with PMH significant for CAD, HLD, HTN and advanced metastatic (stage IV) colo-rectal cancer who was sent down from oncology clinic for admission for hospice. Patient was recently diagnosed with adenocarcinoma of the colon on c-scope 5/4/23 with mets to liver, abdomen, and pelvis on imaging. He presented for initial appointment to discuss treatment options, but was tachycardic and hypotensive so after discussion with his oncologist, was sent to ED to be admitted and set up with hospice. Patient currently complaining of bloody BM's x2 weeks associated with fatigue and SOB. Endorses passing clots in his bowel movements. Denies fever, chills, increased cough/sputum, dysuria. Pt would like to "gain his strength" back and go home with hospice.    In the ED, labs significant for leukocytosis with left shift WBC 38.8, H&H 7/22, MCV 78.3, platelets 837. Pt hyponatremic Na 128, K 3.3; RAMANDEEP with BUN/Cr 36.4/2.57, hypoalbuminemia 1.6. Trop negative, lactate 5. Blood cultures obtained in ED< no consolidation on CXR, port-catheter with tip over SVC in place. Patient initially hypotensive with bP 78/52,  on admission, afebrile satting 100% on room air. Hypotension resolved with IVF resuscitation. Medicine consulted for admission to stabilize patient and assist with hospice coordination.    Interval Hx:  NAEO. Received 1 unit pRBC yesterday, blood counts slightly improved. Still endorsing fatigue, denies any sob,chest pain, palpitations, dysuria, hematuria.  Pt continues to want to " be a full code and accepted to palliative with [facility] despite continued discussions about goals of care.        Review of Systems:  14 point ROS negative unless noted in HPI         Objective:   Last 24 Hour Vital Signs:  BP  Min: 104/66  Max: 129/72  Temp  Av °F (36.7 °C)  Min: 97.5 °F (36.4 °C)  Max: 98.9 °F (37.2 °C)  Pulse  Av.3  Min: 94  Max: 110  Resp  Av.9  Min: 16  Max: 18  SpO2  Av.2 %  Min: 96 %  Max: 99 %  Body mass index is 29.13 kg/m².  I/O last 3 completed shifts:  In: 2870 [P.O.:195; I.V.:2375; Blood:300]  Out: 1760 [Urine:1760]    Physical Examination:  Physical Exam  Constitutional:       General: He is not in acute distress.     Appearance: He is obese. He is ill-appearing. He is not toxic-appearing or diaphoretic.   HENT:      Head: Normocephalic and atraumatic.      Mouth/Throat:      Mouth: Mucous membranes are dry.      Pharynx: Oropharynx is clear.   Eyes:      Extraocular Movements: Extraocular movements intact.      Pupils: Pupils are equal, round, and reactive to light.      Comments: Pale sclera, conjunctiva   Cardiovascular:      Pulses: Normal pulses.      Heart sounds: No murmur heard.    No friction rub. No gallop.   Pulmonary:      Effort: Pulmonary effort is normal. No respiratory distress.      Breath sounds: Normal breath sounds. No wheezing, rhonchi or rales.   Abdominal:      General: Abdomen is flat. Bowel sounds are normal. There is no distension.      Palpations: Abdomen is soft.      Tenderness: Tenderness: diffuse generalized TTP.   Musculoskeletal:         General: Swelling (left upper arm swelling) present.      Right lower leg: No edema.      Left lower leg: No edema.   Skin:     General: Skin is warm and dry.      Capillary Refill: Capillary refill takes less than 2 seconds.   Neurological:      General: No focal deficit present.      Mental Status: He is alert. Mental status is at baseline.      Cranial Nerves: No cranial nerve deficit.       Motor: No weakness.      Comments: Oriented to self, general date (Saturday early July 2023), general place ([facility])         Laboratory:  Most Recent Data:  CBC:   Lab Results   Component Value Date    WBC 32.65 (H) 07/09/2023    HGB 7.4 (L) 07/09/2023    HCT 22.9 (L) 07/09/2023     (H) 07/09/2023    MCV 79.2 (L) 07/09/2023    RDW 17.8 (H) 07/09/2023     WBC Differential:   Recent Labs   Lab 07/05/23  0431 07/06/23  0351 07/06/23  1220 07/08/23  0659 07/09/23  0347   WBC 27.75* 31.83* 31.23* 32.45* 32.65*   HGB 7.9* 7.5* 7.4* 7.1* 7.4*   HCT 23.9* 23.1* 22.9* 22.1* 22.9*   * 613* 612* 596* 548*   MCV 79.9* 79.7* 80.6 79.8* 79.2*       BMP:   Lab Results   Component Value Date     07/09/2023    K 4.4 07/09/2023    CO2 20 (L) 07/09/2023    BUN 36.5 (H) 07/09/2023    CREATININE 1.71 (H) 07/09/2023    CALCIUM 8.6 (L) 07/09/2023    MG 2.30 07/08/2023    PHOS 4.5 07/08/2023     LFTs:   Lab Results   Component Value Date    ALBUMIN 1.2 (L) 07/09/2023    BILITOT 3.1 (H) 07/09/2023    AST 36 (H) 07/09/2023    ALKPHOS 602 (H) 07/09/2023    ALT 16 07/09/2023     Coags:   Lab Results   Component Value Date    INR 1.12 06/08/2023    PROTIME 14.2 06/08/2023    PTT 33.7 04/24/2023     FLP:   Lab Results   Component Value Date    CHOL 211 (H) 04/07/2021    HDL 57 04/07/2021    LDLCALC 103 04/07/2021    TRIG 256 (H) 04/07/2021     DM:   Lab Results   Component Value Date    LDLCALC 103 04/07/2021    CREATININE 1.71 (H) 07/09/2023     Thyroid: No results found for: TSH, FREET4, V9PYSCR, Z9HLVNP, THYROIDAB  Anemia: No results found for: IRON, TIBC, FERRITIN, TDMKGVOP70, FOLATE  Cardiac:   Lab Results   Component Value Date    TROPONINI <0.010 07/03/2023     Urinalysis:   Lab Results   Component Value Date    PHUA 5.5 07/04/2023    UROBILINOGEN 1+ (A) 07/04/2023    WBCUA 0-5 07/04/2023       Trended Lab Data:  Recent Labs   Lab 07/06/23  1220 07/08/23  0658 07/08/23  0659 07/09/23  0347   WBC 31.23*   --  32.45* 32.65*   HGB 7.4*  --  7.1* 7.4*   HCT 22.9*  --  22.1* 22.9*   *  --  596* 548*   MCV 80.6  --  79.8* 79.2*   RDW 17.2*  --  17.6* 17.8*   * 138  --  138   K 4.7 4.5  --  4.4   CO2 22* 20*  --  20*   BUN 46.9* 47.1*  --  36.5*   CREATININE 2.94* 2.42*  --  1.71*   ALBUMIN 1.2* 1.3*  --  1.2*   BILITOT 2.3* 2.7*  --  3.1*   AST 48* 47*  --  36*   ALKPHOS 615* 699*  --  602*   ALT 20 17  --  16         Trended Cardiac Data:  Recent Labs   Lab 07/03/23  1604   TROPONINI <0.010         Microbiology Data:  Blood cultures 7/3/23 x2 sets positive E. Coli with biofire confirmation (no ESBL) resistant to cipro but sensitive to rocephin   Repeat blood culture 7/6/23: E. Coli x1/2 sets  Other Results:  EKG (my interpretation): n/a    Radiology:  Imaging Results              X-Ray Chest AP Portable (Final result)  Result time 07/03/23 16:28:43      Final result by Yady Guillermo MD (07/03/23 16:28:43)                   Impression:      No acute abnormality of the chest.      Electronically signed by: Yady Guillermo  Date:    07/03/2023  Time:    16:28               Narrative:    EXAMINATION:  XR CHEST AP PORTABLE    CLINICAL HISTORY:  Sepsis;    COMPARISON:  Chest x-ray dated 06/05/2023    FINDINGS:  Right-sided chest wall port catheter has its tip over the superior vena cava.  The heart is normal in size.  The lungs are clear.  There is no pleural effusion or visible pneumothorax.                                           Assessment & Plan:     Stage IV Metastatic Colon Cancer       - Advanced metastatic colon cancer       - Mets noted to liver, abdomen/pelvis       - Pt not a candidate for chemo/radiation/surgical resection; agreed to hospice dispo with oncologist       - Patient accepted to palliative with [facility].      Anemia  Likely Lower GI bleed 2/2 above       - Admission H&H 7/22 down from 11.4/34.1 six weeks prior       - S/p 2 units pRBC on admission and 1 more 7/8/23; H&H  7.4/22.9 today       - Continues to have melena stools from colon cancer       - No need for GI services given palliative care with impending hospice disposition      E. Coli Bacteremia  Sepsis        - WBC 38K with left shift, HR > 90, +blood cultures x2 E. Coli, sensitive to rocephin; UA showed no signs of infection, likely source from GI tract given colon cancer       - Switched from  Zosyn  IV Rocephin day 6/14 of IV Abx, Will need total of 14 days of IV abx given pt resistant to cipro (Last day 7/17/23)       -repeat blood cultures on 7/6 at 48h jose a of previous were + E. coli       -Pt accepted to palliative with [facility] but does not have insurance to receive home health for IV abx.    RAMANDEEP  Anion Gap Metabolic Acidosis       - BUN/Cr on admit 36.4/2.57, has fluctuated but improved today 36.5/1.71       - Unsure if ATN from hypotension, no casts seen in urine; occasional crystals, no sign of UTI on UA       - Anion gap of 14; will obtain lactic acid following blood transfusion today       - Will continue LR at 125 cc/hr; will give 1 unit blood as well     AMS-improving       - Pt confused yesterday per nurse, and again today. Oriented to self and is pleasant, but disoriented to place/time/situation which is new since admission.        - MRI negative for mets/CVA       - Likely delirium; seems to be improving, generally oriented to self, place ([facility]), and date (early July 2023).    Left Arm Swelling        - Pt's left arm swollen, had been having mIVF to this arm        - DVT US LUE negative; likely from infiltrated IV    CODE STATUS: Full Code  Access: PIV  Antibiotics: None  Diet: regular diet   DVT Prophylaxis: SCD, will start heparin 5K BID DVT ppx given increased risk with malignancy  GI Prophylaxis: Protonix  Fluids: None       Disposition: Pt accepted to palliative with [facility] but does not have insurance to receive home health for IV abx on day 6/14 days needed. Continues to want palliative  despite hospice being more appropriate dispo, also continues to want to be full code.       Dixon Baca MD  [facility] IM PGY III

## 2023-07-10 VITALS
BODY MASS INDEX: 29.12 KG/M2 | HEART RATE: 100 BPM | DIASTOLIC BLOOD PRESSURE: 71 MMHG | WEIGHT: 226.88 LBS | OXYGEN SATURATION: 98 % | HEIGHT: 74 IN | RESPIRATION RATE: 18 BRPM | TEMPERATURE: 98 F | SYSTOLIC BLOOD PRESSURE: 115 MMHG

## 2023-07-10 LAB
ALBUMIN SERPL-MCNC: 2.6 G/DL (ref 3.4–4.8)
ALBUMIN/GLOB SERPL: 0.7 RATIO (ref 1.1–2)
ALP SERPL-CCNC: 108 UNIT/L (ref 40–150)
ALT SERPL-CCNC: 21 UNIT/L (ref 0–55)
AST SERPL-CCNC: 21 UNIT/L (ref 5–34)
BACTERIA BLD CULT: ABNORMAL
BILIRUBIN DIRECT+TOT PNL SERPL-MCNC: 0.2 MG/DL
BUN SERPL-MCNC: 9.1 MG/DL (ref 8.4–25.7)
CALCIUM SERPL-MCNC: 8.4 MG/DL (ref 8.8–10)
CHLORIDE SERPL-SCNC: 108 MMOL/L (ref 98–107)
CO2 SERPL-SCNC: 23 MMOL/L (ref 23–31)
CREAT SERPL-MCNC: 0.7 MG/DL (ref 0.73–1.18)
GFR SERPLBLD CREATININE-BSD FMLA CKD-EPI: >60 MLS/MIN/1.73/M2
GLOBULIN SER-MCNC: 3.5 GM/DL (ref 2.4–3.5)
GLUCOSE SERPL-MCNC: 337 MG/DL (ref 82–115)
GRAM STN SPEC: ABNORMAL
POTASSIUM SERPL-SCNC: 3.4 MMOL/L (ref 3.5–5.1)
PROT SERPL-MCNC: 6.1 GM/DL (ref 5.8–7.6)
SODIUM SERPL-SCNC: 139 MMOL/L (ref 136–145)

## 2023-07-10 PROCEDURE — 99900035 HC TECH TIME PER 15 MIN (STAT)

## 2023-07-10 PROCEDURE — S0179 MEGESTROL 20 MG: HCPCS | Performed by: STUDENT IN AN ORGANIZED HEALTH CARE EDUCATION/TRAINING PROGRAM

## 2023-07-10 PROCEDURE — 80053 COMPREHEN METABOLIC PANEL: CPT | Performed by: STUDENT IN AN ORGANIZED HEALTH CARE EDUCATION/TRAINING PROGRAM

## 2023-07-10 PROCEDURE — 63600175 PHARM REV CODE 636 W HCPCS: Performed by: STUDENT IN AN ORGANIZED HEALTH CARE EDUCATION/TRAINING PROGRAM

## 2023-07-10 PROCEDURE — 97530 THERAPEUTIC ACTIVITIES: CPT

## 2023-07-10 PROCEDURE — 94760 N-INVAS EAR/PLS OXIMETRY 1: CPT

## 2023-07-10 PROCEDURE — 25000003 PHARM REV CODE 250: Performed by: STUDENT IN AN ORGANIZED HEALTH CARE EDUCATION/TRAINING PROGRAM

## 2023-07-10 PROCEDURE — 97116 GAIT TRAINING THERAPY: CPT

## 2023-07-10 PROCEDURE — 76937 US GUIDE VASCULAR ACCESS: CPT

## 2023-07-10 PROCEDURE — C1751 CATH, INF, PER/CENT/MIDLINE: HCPCS

## 2023-07-10 PROCEDURE — 94761 N-INVAS EAR/PLS OXIMETRY MLT: CPT

## 2023-07-10 PROCEDURE — 36410 VNPNXR 3YR/> PHY/QHP DX/THER: CPT

## 2023-07-10 RX ORDER — SODIUM CHLORIDE 0.9 % (FLUSH) 0.9 %
10 SYRINGE (ML) INJECTION
Status: DISCONTINUED | OUTPATIENT
Start: 2023-07-10 | End: 2023-07-10 | Stop reason: HOSPADM

## 2023-07-10 RX ORDER — SODIUM CHLORIDE 0.9 % (FLUSH) 0.9 %
10 SYRINGE (ML) INJECTION EVERY 6 HOURS
Status: DISCONTINUED | OUTPATIENT
Start: 2023-07-10 | End: 2023-07-10 | Stop reason: HOSPADM

## 2023-07-10 RX ADMIN — SODIUM CHLORIDE, POTASSIUM CHLORIDE, SODIUM LACTATE AND CALCIUM CHLORIDE: 600; 310; 30; 20 INJECTION, SOLUTION INTRAVENOUS at 12:07

## 2023-07-10 RX ADMIN — CEFTRIAXONE SODIUM 2 G: 2 INJECTION, POWDER, FOR SOLUTION INTRAMUSCULAR; INTRAVENOUS at 11:07

## 2023-07-10 RX ADMIN — PANTOPRAZOLE 40 MG: 40 TABLET, DELAYED RELEASE ORAL at 08:07

## 2023-07-10 RX ADMIN — MORPHINE SULFATE 15 MG: 15 TABLET, EXTENDED RELEASE ORAL at 08:07

## 2023-07-10 RX ADMIN — MEGESTROL ACETATE 800 MG: 400 SUSPENSION ORAL at 08:07

## 2023-07-10 RX ADMIN — HEPARIN SODIUM 5000 UNITS: 5000 INJECTION, SOLUTION INTRAVENOUS; SUBCUTANEOUS at 08:07

## 2023-07-10 NOTE — PLAN OF CARE
CTI and DC summary sent to [facility] via [facility].

## 2023-07-10 NOTE — PLAN OF CARE
Spoke to Katharine with [facility], P: [phone]. She stated that they can take patient to the [facility] today and finish his IV antibiotics there. Family states that they will not be able to take care of patient at home and would like nursing home placement for him upon completion of his stay at [facility]. LOCET called and MELVIN faxed to [facility]. Referrals sent to 20 nursing homes within 20 miles of patient's address as requested by Katharine. Received call from Aniya Long at the [facility], P: [phone], stating that they are interested in patient and currently reviewing his records. Katharine has been updated.

## 2023-07-10 NOTE — DISCHARGE SUMMARY
[facility] Discharge Summary    Admitting Physician: Bushra Mccarty MD  Attending Physician: Bushra Mccarty MD  Date of Admit: 7/3/2023  Date of Discharge: 7/10/2023    Condition: Stable  Outcome:  Patient is being discharged to hospice care  DISPOSITION: Hospice/Medical Facility        Discharge Diagnoses:     Patient Active Problem List   Diagnosis    Mixed hyperlipidemia    Primary hypertension    Old myocardial infarction    Atherosclerosis of native coronary artery of native heart without angina pectoris    Over weight    Preop cardiovascular exam    Gastroesophageal reflux disease without esophagitis    Rectal cancer    Adenocarcinoma of rectum, stage 4    Pelvic lymphadenopathy    Adenocarcinoma of rectum metastatic to liver    Anorexia    Unintended weight gain    Rectal bleeding    Rectal pain    Tenesmus    Unintentional weight loss    Severe malnutrition       Principal Problem:  Adenocarcinoma of rectum, stage 4    Consultants and Procedures:     Consultants:  IP CONSULT TO HOSPITAL MEDICINE  IP CONSULT TO SOCIAL WORK/CASE MANAGEMENT  IP CONSULT TO SOCIAL WORK/CASE MANAGEMENT  IP CONSULT TO SOCIAL WORK/CASE MANAGEMENT  IP CONSULT TO MIDLINE TEAM    Procedures:   * No surgery found *      Brief Admission History:      Ashkan Mathews is a 62 y.o. male with PMH significant for CAD, HLD, HTN and advanced metastatic (stage IV) colo-rectal cancer who was sent down from oncology clinic for admission for hospice. Patient was recently diagnosed with adenocarcinoma of the colon on c-scope 5/4/23 with mets to liver, abdomen, and pelvis on imaging. He presented for initial appointment to discuss treatment options, but was tachycardic and hypotensive so after discussion with his oncologist, was sent to ED to be admitted and set up with hospice. Patient currently complaining of bloody BM's x2 weeks associated with fatigue and SOB. Endorses passing clots in his bowel movements. Denies fever, chills,  "increased cough/sputum, dysuria. Pt would like to "gain his strength" back and go home with hospice.    Hospital Course with Pertinent Findings:      During his stay, patient had continued anemic episodes likely secondary to patient's colorectal malignancy.  Patient required transfusion x3 units over his hospital course with good resolution of his anemia.  Patient was also found to have E coli bacteremia with ESBL.  Patient was initiated on ciprofloxacin and will require continued antibiotic therapy until 07/17/2023.  Discussions were made with family at bedside in regards to patient's code status and patient was made DNR.  Patient's family accepted hospice care and patient will be discharged to inpatient hospice facility where he can complete his antibiotic therapy at which point they will continue goal hospice at nursing home.  Patient and patient's family are agreeable with plan of care.      Discharge physical exam:  Vitals  BP: 125/79  Temp: 97.7 °F (36.5 °C)  Temp Source: Oral  Pulse: 96  Resp: 18  SpO2: 98 %  Height: 6' 2" (188 cm)  Weight: 102.9 kg (226 lb 13.7 oz)    General appearance:  Ill-appearing male, nontoxic, in no acute cardiopulmonary distress   Head: Normocephalic, without obvious abnormality, atraumatic  Neck: no adenopathy, no carotid bruit, no JVD and supple, symmetrical, trachea midline  Lungs: clear to auscultation bilaterally  Heart: regular rate and rhythm, S1, S2 normal, no murmur, click, rub or gallop  Abdomen: soft, tender to palpation diffusely in all 4 quadrants, mild distention noted; bowel sounds normal; no masses,  no organomegaly  Extremities: extremities normal, atraumatic, no cyanosis , upper extremity edema noted 1+ nonpitting  Pulses: 2+ and symmetric  Skin: Skin color, texture, turgor normal. No rashes or lesions  Neurologic:  Alert, oriented to self        Discharge Medications:         Medication List        START taking these medications      dextrose 5 % in water (D5W) 5 " % PgBk 100 mL with cefTRIAXone 2 gram SolR 2 g  Inject 2 g into the vein every 12 (twelve) hours. for 5 days            CONTINUE taking these medications      amLODIPine 5 MG tablet  Commonly known as: NORVASC     hydroCHLOROthiazide 25 MG tablet  Commonly known as: HYDRODIURIL     HYDROcodone-acetaminophen  mg per tablet  Commonly known as: NORCO  Take 1 tablet by mouth every 4 (four) hours as needed for Pain.     losartan 100 MG tablet  Commonly known as: COZAAR     megestroL 400 mg/10 mL (40 mg/mL) Susp  Commonly known as: MEGACE  Take 20 mLs (800 mg total) by mouth once daily.     metoprolol succinate 25 MG 24 hr tablet  Commonly known as: TOPROL-XL     omeprazole 40 MG capsule  Commonly known as: PRILOSEC     ondansetron 4 MG tablet  Commonly known as: ZOFRAN  Take 1 tablet (4 mg total) by mouth every 6 (six) hours as needed for Nausea.     polyethylene glycol 100-7.5-2.691 gram solution  Commonly known as: MOVIPREP  Take a directed per instructions provided by GI Clinic.               Where to Get Your Medications        Information about where to get these medications is not yet available    Ask your nurse or doctor about these medications  dextrose 5 % in water (D5W) 5 % PgBk 100 mL with cefTRIAXone 2 gram SolR 2 g         Discharge Instructions:         Ashkan ARVIZU Reid is being discharged Hospice/Home.    No discharge procedures on file.     Follow-Up Appointments:        TIME SPENT ON DISCHARGE: 35 minutes    Petrona Fairbanks DO  [facility] Internal Medicine, -3  07/10/2023

## 2023-07-10 NOTE — PT/OT/SLP DISCHARGE
Physical Therapy Discharge Summary    Name: Ashkan Mathews  MRN: [id number]   Principal Problem: Adenocarcinoma of rectum, stage 4     Patient Discharged from acute Physical Therapy on 7/10/2023  .  Please refer to prior PT noted date on 7/10/2023   for functional status.     Assessment:     Pt. Leaving for [facility]    Objective:     GOALS:   Multidisciplinary Problems       Physical Therapy Goals          Problem: Physical Therapy    Goal Priority Disciplines Outcome Goal Variances Interventions   Physical Therapy Goal     PT, PT/OT Ongoing, Progressing     Description: Goals to be met by: dc     Patient will increase functional independence with mobility by performin. Supine to sit with Modified [place]- ONGOING  2. Sit to stand transfer with Modified [place]- ONGOING  3. Gait  x 130 feet with Supervision using Rolling Walker. - ONGOING    Reviewed 7/10/2023 goals not met                           Reasons for Discontinuation of Therapy Services  Transfer to alternate level of care.      Plan:     Patient Discharged to: Palliative Care/Hospice.      7/10/2023

## 2023-07-10 NOTE — PLAN OF CARE
Problem: Adult Inpatient Plan of Care  Goal: Plan of Care Review  Outcome: Ongoing, Progressing  Goal: Patient-Specific Goal (Individualized)  Outcome: Ongoing, Progressing  Goal: Absence of Hospital-Acquired Illness or Injury  Outcome: Ongoing, Progressing  Goal: Optimal Comfort and Wellbeing  Outcome: Ongoing, Progressing  Goal: Readiness for Transition of Care  Outcome: Ongoing, Progressing     Problem: Pain Acute  Goal: Acceptable Pain Control and Functional Ability  Outcome: Ongoing, Progressing     Problem: Infection  Goal: Absence of Infection Signs and Symptoms  Outcome: Ongoing, Progressing     Problem: Fall Injury Risk  Goal: Absence of Fall and Fall-Related Injury  Outcome: Ongoing, Progressing

## 2023-07-10 NOTE — PROCEDURES
"Ashkan Mathews is a 62 y.o. male patient.    Temp: 97.7 °F (36.5 °C) (07/10/23 1127)  Pulse: 96 (07/10/23 1127)  Resp: 18 (07/10/23 0800)  BP: 125/79 (07/10/23 1127)  SpO2: 98 % (07/10/23 1127)  Weight: 102.9 kg (226 lb 13.7 oz) (07/07/23 1143)  Height: 6' 2" (188 cm) (07/03/23 2028)    PICC  Date/Time: 7/10/2023 2:36 PM  Performed by: Kyle Edmondson RN  Consent Done: Yes  Time out: Immediately prior to procedure a time out was called to verify the correct patient, procedure, equipment, support staff and site/side marked as required  Indications: med administration and vascular access  Anesthesia: local infiltration  Local anesthetic: lidocaine 1% without epinephrine    Skin prep agent dried: skin prep agent completely dried prior to procedure  Sterile barriers: all five maximum sterile barriers used - cap, mask, sterile gown, sterile gloves, and large sterile sheet  Hand hygiene: hand hygiene performed prior to central venous catheter insertion  Location details: left brachial  Catheter type: single lumen  Catheter size: 4 Fr  Catheter Length: 15cm    Ultrasound guidance: yes  , compressibility normal  Needle advanced into vessel with real time Ultrasound guidance.  Guidewire confirmed in vessel.  Sterile sheath used.  Number of attempts: 1  Post-procedure: blood return through all ports, chlorhexidine patch and sterile dressing applied    Assessment: successful placement        Name Kyle Edmondson RN   7/10/2023    "

## 2023-07-10 NOTE — CARE UPDATE
[facility]  On Call Event Note       Event Note:      I have spoken with the patient's family at length today about the patient's CODE STATUS.  I have fully informed them of the risks and benefits of CPR and mechanical ventilation.  We have also discussed the differences between FULL CODE, DNR/DNI and DNR.  They are fully aware were able to repeat these things back to me.  They, without coercion or bias, made a decision to make     Ashkan Mathews    CODE STATUS: DNR; status confirmed/order placed in chart    Petrona Fairbanks DO  [facility], HO-3  07/10/2023

## 2023-07-10 NOTE — PT/OT/SLP PROGRESS
Physical Therapy Treatment    Patient Name:  Ashkan Mathews   MRN:  [id number]    Recommendations     Discharge Recommendations:  home with hospice   Discharge Equipment Recommendations: bedside commode, walker, rolling, shower chair, hospital bed   Barriers to discharge: fall risk, level of skilled assistance required, decreased endurance, and decreased safety awareness    Assessment     Ashkan Mathews is a 62 y.o. male admitted with a medical diagnosis of Adenocarcinoma of rectum, stage 4.    He presents with the following impairments/functional limitations:  weakness, impaired endurance, impaired sensation, impaired self care skills, impaired functional mobility, gait instability, impaired balance, decreased safety awareness, pain, edema, decreased lower extremity function.    Rehab Prognosis: Fair.    Patient would benefit from continued skilled acute PT services to: address above listed impairments/functional limitations; receive patient/caregiver education; reduce fall risk; and maximize independency/safety with functional mobility.    Recent Surgery: * No surgery found *      Plan     During this hospitalization, patient to be seen 3 x/week to address the identified impairments/functional limitations via gait training, therapeutic activities, therapeutic exercises and progress toward the established goals.    Plan of Care Expires:  08/03/23    Subjective     Communicated with patient's nurse Venita prior to session.    Patient agreeable to participate in treatment session.    Chief Complaint: slightly agitated at the beginning of session. Pt. Had some rectal pain, which stopped after BM.  Patient/Family Comments/goals: none stated.  Pain/Comfort:  Pain Rating 1: 5/10  Location 1: other (see comments) (rectal)  Pain Addressed 1: Reposition, Distraction  Pain Rating Post-Intervention 1: 0/10    Objective     Patient found supine in bed and with HOB elevated with peripheral IV  upon PT entry to  room.    General Precautions: Standard, fall   Orthopedic Precautions:N/A   Braces: N/A  Respiratory Status: room air    Functional Mobility:    Bed Mobility:  Supine to Sit: minimum assistance  Pt. Was rolling from side to side during cleaning procedure. Pt. Unaware of BM he had prior to session. Pt. Was able to perform bridging x 3 reps. Supine to sit pt. Required min A for hand placement.     Transfers:  Sit to Stand: stand by assistance with rolling walker. Pt. Didn't want therapist to assist him. Pt. Went to the bathroom after his walk around the nurses station and performed bathroom transfers. Pt. Needed assistance with cleaning himself and adjusting clothing. Pt. Was very tired after all activities.     Gait:  Patient ambulated 130 ft with rolling walker and contact guard assistance.  Patient demonstrates no loss of balance, occasional unsteady gait, decreased angelica, decreased step length, and decreased foot/floor clearance.    Other Mobility:  not assessed    Patient left supine in bed and with HOB elevated with call button in reach, tray table at bedside, and Venita notified. Bed alarm activated.    Goals     Multidisciplinary Problems       Physical Therapy Goals          Problem: Physical Therapy    Goal Priority Disciplines Outcome Goal Variances Interventions   Physical Therapy Goal     PT, PT/OT Ongoing, Progressing     Description: Goals to be met by: dc     Patient will increase functional independence with mobility by performin. Supine to sit with Modified [place]- ONGOING  2. Sit to stand transfer with Modified [place]- ONGOING  3. Gait  x 130 feet with Supervision using Rolling Walker. - ONGOING    Reviewed 7/10/2023                         Time Tracking     PT Received On: 07/10/23  PT Start Time: 903     PT Stop Time: 945  PT Total Time (min): 42 min     Billable Minutes: Gait Training 12 and Therapeutic Activity 30    07/10/2023

## 2023-08-16 LAB
INR PPP: 1.1
PROTHROMBIN TIME: 14 SECONDS (ref 11.4–14)

## (undated) DEVICE — SUT 2/0 30IN PROLENE MONO

## (undated) DEVICE — SUT PDSII 4-0 PS-2 CLEAR MO

## (undated) DEVICE — GLOVE PROTEXIS BLUE LATEX 8

## (undated) DEVICE — GLOVE PROTEXIS HYDROGEL SZ6.5

## (undated) DEVICE — NDL HYPO REG 25G X 1 1/2

## (undated) DEVICE — CLIPPER BLADE MOD 4406 (CAREF)

## (undated) DEVICE — GLOVE PROTEXIS BLUE LATEX 7

## (undated) DEVICE — KIT SURGICAL COLON .25 1.1OZ

## (undated) DEVICE — DRAPE C-ARM COVER EZ 36X28IN

## (undated) DEVICE — SNARE EXACTO COLD

## (undated) DEVICE — SYR 10CC LUER LOCK

## (undated) DEVICE — SUT 3-0 VICRYL / SH (J416)

## (undated) DEVICE — HANDLE DEVON RIGID OR LIGHT

## (undated) DEVICE — FORCEP ALLIGATOR 2.8MM W/NDL

## (undated) DEVICE — GLOVE PROTEXIS BLUE LATEX 6.5

## (undated) DEVICE — Device

## (undated) DEVICE — KIT SURGICAL TURNOVER

## (undated) DEVICE — GOWN POLY REINF BRTH SLV XL

## (undated) DEVICE — BLADE SURG STAINLESS STEEL #11

## (undated) DEVICE — GEL AQUASONIC 100 STERILE20GM

## (undated) DEVICE — ADHESIVE DERMABOND ADVANCED

## (undated) DEVICE — MANIFOLD 4 PORT

## (undated) DEVICE — APPLICATOR CHLORAPREP ORN 26ML

## (undated) DEVICE — COVER SITE-RITE PROBE 96IN

## (undated) DEVICE — TRAP ETRAP POLYP 50 TRAY

## (undated) DEVICE — SYR DISP LL 5CC

## (undated) DEVICE — SOL IRRI STRL WATER 1000ML

## (undated) DEVICE — BAG MEDI-PLAST DECANTER C-FLOW

## (undated) DEVICE — GLOVE PROTEXIS HYDROGEL SZ7.5

## (undated) DEVICE — CONTAINER SPECIMEN 4.5OZ